# Patient Record
Sex: MALE | Race: WHITE | NOT HISPANIC OR LATINO | Employment: FULL TIME | URBAN - METROPOLITAN AREA
[De-identification: names, ages, dates, MRNs, and addresses within clinical notes are randomized per-mention and may not be internally consistent; named-entity substitution may affect disease eponyms.]

---

## 2017-01-18 ENCOUNTER — APPOINTMENT (OUTPATIENT)
Dept: RADIOLOGY | Facility: MEDICAL CENTER | Age: 57
End: 2017-01-18
Attending: SURGERY
Payer: COMMERCIAL

## 2017-02-09 ENCOUNTER — HOSPITAL ENCOUNTER (OUTPATIENT)
Dept: RADIOLOGY | Facility: MEDICAL CENTER | Age: 57
End: 2017-02-09
Attending: SURGERY
Payer: COMMERCIAL

## 2017-02-09 DIAGNOSIS — I65.21 STENOSIS OF RIGHT CAROTID ARTERY: ICD-10-CM

## 2017-02-09 PROCEDURE — 93880 EXTRACRANIAL BILAT STUDY: CPT

## 2020-10-10 ENCOUNTER — HOSPITAL ENCOUNTER (INPATIENT)
Dept: HOSPITAL 8 - ED | Age: 60
LOS: 4 days | Discharge: HOME | DRG: 855 | End: 2020-10-14
Attending: INTERNAL MEDICINE | Admitting: FAMILY MEDICINE
Payer: COMMERCIAL

## 2020-10-10 VITALS — DIASTOLIC BLOOD PRESSURE: 75 MMHG | SYSTOLIC BLOOD PRESSURE: 110 MMHG

## 2020-10-10 VITALS — BODY MASS INDEX: 35.95 KG/M2 | WEIGHT: 229.06 LBS | HEIGHT: 67 IN

## 2020-10-10 DIAGNOSIS — N49.2: ICD-10-CM

## 2020-10-10 DIAGNOSIS — Z20.828: ICD-10-CM

## 2020-10-10 DIAGNOSIS — I25.10: ICD-10-CM

## 2020-10-10 DIAGNOSIS — Z91.19: ICD-10-CM

## 2020-10-10 DIAGNOSIS — I48.0: ICD-10-CM

## 2020-10-10 DIAGNOSIS — E66.9: ICD-10-CM

## 2020-10-10 DIAGNOSIS — I25.2: ICD-10-CM

## 2020-10-10 DIAGNOSIS — A41.9: Primary | ICD-10-CM

## 2020-10-10 DIAGNOSIS — G89.29: ICD-10-CM

## 2020-10-10 DIAGNOSIS — Z95.5: ICD-10-CM

## 2020-10-10 DIAGNOSIS — E78.00: ICD-10-CM

## 2020-10-10 DIAGNOSIS — Z86.73: ICD-10-CM

## 2020-10-10 DIAGNOSIS — K40.90: ICD-10-CM

## 2020-10-10 DIAGNOSIS — E11.65: ICD-10-CM

## 2020-10-10 DIAGNOSIS — G47.33: ICD-10-CM

## 2020-10-10 DIAGNOSIS — Z90.79: ICD-10-CM

## 2020-10-10 DIAGNOSIS — Z88.8: ICD-10-CM

## 2020-10-10 DIAGNOSIS — E78.5: ICD-10-CM

## 2020-10-10 DIAGNOSIS — I10: ICD-10-CM

## 2020-10-10 DIAGNOSIS — F32.9: ICD-10-CM

## 2020-10-10 DIAGNOSIS — Z85.46: ICD-10-CM

## 2020-10-10 LAB
ALBUMIN SERPL-MCNC: 3.2 G/DL (ref 3.4–5)
ANION GAP SERPL CALC-SCNC: 4 MMOL/L (ref 5–15)
BASOPHILS # BLD AUTO: 0.1 X10^3/UL (ref 0–0.1)
BASOPHILS NFR BLD AUTO: 1 % (ref 0–1)
CALCIUM SERPL-MCNC: 9.2 MG/DL (ref 8.5–10.1)
CHLORIDE SERPL-SCNC: 104 MMOL/L (ref 98–107)
CREAT SERPL-MCNC: 1.21 MG/DL (ref 0.7–1.3)
EOSINOPHIL # BLD AUTO: 0.1 X10^3/UL (ref 0–0.4)
EOSINOPHIL NFR BLD AUTO: 2 % (ref 1–7)
ERYTHROCYTE [DISTWIDTH] IN BLOOD BY AUTOMATED COUNT: 14.5 % (ref 9.4–14.8)
LYMPHOCYTES # BLD AUTO: 1.4 X10^3/UL (ref 1–3.4)
LYMPHOCYTES NFR BLD AUTO: 15 % (ref 22–44)
MCH RBC QN AUTO: 29.1 PG (ref 27.5–34.5)
MCHC RBC AUTO-ENTMCNC: 33.7 G/DL (ref 33.2–36.2)
MD: NO
MICROSCOPIC: (no result)
MONOCYTES # BLD AUTO: 0.7 X10^3/UL (ref 0.2–0.8)
MONOCYTES NFR BLD AUTO: 8 % (ref 2–9)
NEUTROPHILS # BLD AUTO: 7 X10^3/UL (ref 1.8–6.8)
NEUTROPHILS NFR BLD AUTO: 75 % (ref 42–75)
PLATELET # BLD AUTO: 248 X10^3/UL (ref 130–400)
PMV BLD AUTO: 8.3 FL (ref 7.4–10.4)
RBC # BLD AUTO: 5.05 X10^6/UL (ref 4.38–5.82)

## 2020-10-10 PROCEDURE — 96361 HYDRATE IV INFUSION ADD-ON: CPT

## 2020-10-10 PROCEDURE — 85025 COMPLETE CBC W/AUTO DIFF WBC: CPT

## 2020-10-10 PROCEDURE — 87635 SARS-COV-2 COVID-19 AMP PRB: CPT

## 2020-10-10 PROCEDURE — 74177 CT ABD & PELVIS W/CONTRAST: CPT

## 2020-10-10 PROCEDURE — 93005 ELECTROCARDIOGRAM TRACING: CPT

## 2020-10-10 PROCEDURE — 87186 SC STD MICRODIL/AGAR DIL: CPT

## 2020-10-10 PROCEDURE — 87077 CULTURE AEROBIC IDENTIFY: CPT

## 2020-10-10 PROCEDURE — 90686 IIV4 VACC NO PRSV 0.5 ML IM: CPT

## 2020-10-10 PROCEDURE — 76870 US EXAM SCROTUM: CPT

## 2020-10-10 PROCEDURE — 36415 COLL VENOUS BLD VENIPUNCTURE: CPT

## 2020-10-10 PROCEDURE — 96375 TX/PRO/DX INJ NEW DRUG ADDON: CPT

## 2020-10-10 PROCEDURE — 80202 ASSAY OF VANCOMYCIN: CPT

## 2020-10-10 PROCEDURE — 80048 BASIC METABOLIC PNL TOTAL CA: CPT

## 2020-10-10 PROCEDURE — 87075 CULTR BACTERIA EXCEPT BLOOD: CPT

## 2020-10-10 PROCEDURE — 87070 CULTURE OTHR SPECIMN AEROBIC: CPT

## 2020-10-10 PROCEDURE — 82040 ASSAY OF SERUM ALBUMIN: CPT

## 2020-10-10 PROCEDURE — 84145 PROCALCITONIN (PCT): CPT

## 2020-10-10 PROCEDURE — 87205 SMEAR GRAM STAIN: CPT

## 2020-10-10 PROCEDURE — 85520 HEPARIN ASSAY: CPT

## 2020-10-10 PROCEDURE — 83605 ASSAY OF LACTIC ACID: CPT

## 2020-10-10 PROCEDURE — 81001 URINALYSIS AUTO W/SCOPE: CPT

## 2020-10-10 PROCEDURE — 96365 THER/PROPH/DIAG IV INF INIT: CPT

## 2020-10-10 PROCEDURE — 87040 BLOOD CULTURE FOR BACTERIA: CPT

## 2020-10-10 PROCEDURE — 82962 GLUCOSE BLOOD TEST: CPT

## 2020-10-10 PROCEDURE — 83036 HEMOGLOBIN GLYCOSYLATED A1C: CPT

## 2020-10-10 RX ADMIN — METRONIDAZOLE SCH MLS/HR: 500 INJECTION, SOLUTION INTRAVENOUS at 22:25

## 2020-10-10 RX ADMIN — HYDROCODONE BITARTRATE AND ACETAMINOPHEN PRN TAB: 5; 325 TABLET ORAL at 22:24

## 2020-10-10 RX ADMIN — MORPHINE SULFATE PRN MG: 4 INJECTION INTRAVENOUS at 13:38

## 2020-10-10 RX ADMIN — INSULIN LISPRO SCH UNITS: 100 INJECTION, SOLUTION INTRAVENOUS; SUBCUTANEOUS at 21:00

## 2020-10-10 RX ADMIN — MORPHINE SULFATE PRN MG: 4 INJECTION INTRAVENOUS at 22:02

## 2020-10-10 RX ADMIN — INSULIN LISPRO SCH UNITS: 100 INJECTION, SOLUTION INTRAVENOUS; SUBCUTANEOUS at 21:11

## 2020-10-10 RX ADMIN — FAMOTIDINE SCH MG: 20 TABLET, FILM COATED ORAL at 21:00

## 2020-10-10 RX ADMIN — HYDROCODONE BITARTRATE AND ACETAMINOPHEN PRN TAB: 5; 325 TABLET ORAL at 17:59

## 2020-10-10 RX ADMIN — ATORVASTATIN CALCIUM SCH MG: 80 TABLET, FILM COATED ORAL at 21:10

## 2020-10-10 RX ADMIN — ENOXAPARIN SODIUM SCH MG: 40 INJECTION SUBCUTANEOUS at 21:00

## 2020-10-10 RX ADMIN — AMPICILLIN SODIUM AND SULBACTAM SODIUM SCH MLS/HR: 2; 1 INJECTION, POWDER, FOR SOLUTION INTRAMUSCULAR; INTRAVENOUS at 21:10

## 2020-10-10 RX ADMIN — AMLODIPINE BESYLATE SCH MG: 10 TABLET ORAL at 21:09

## 2020-10-10 NOTE — NUR
PT REPORTS HE HAS A PAINFUL MASS IN HIS TESTCLE THAT IS CAUSING RIGHT SIDED 
LOWER ABD PAIN X2 DAYS. FAMILY IN ROOM. PT IS AT US.

## 2020-10-10 NOTE — NUR
PT RESTING IN ROOM. VS STABLE. NO ACUTE DISTRESS NOTED. CALL LIGHT IN PLACE. 
WILL CONTINUE TO MONITOR.

## 2020-10-10 NOTE — NUR
PT VISITING WITH WIFE IN ROOM. UA SENT. VS STABLE. NO ACUTE DISTRESS NOTED. 
WILL CONTINUE TO MONITOR.

## 2020-10-11 VITALS — SYSTOLIC BLOOD PRESSURE: 115 MMHG | DIASTOLIC BLOOD PRESSURE: 80 MMHG

## 2020-10-11 VITALS — SYSTOLIC BLOOD PRESSURE: 125 MMHG | DIASTOLIC BLOOD PRESSURE: 85 MMHG

## 2020-10-11 VITALS — DIASTOLIC BLOOD PRESSURE: 78 MMHG | SYSTOLIC BLOOD PRESSURE: 118 MMHG

## 2020-10-11 VITALS — SYSTOLIC BLOOD PRESSURE: 120 MMHG | DIASTOLIC BLOOD PRESSURE: 86 MMHG

## 2020-10-11 LAB
ANION GAP SERPL CALC-SCNC: 4 MMOL/L (ref 5–15)
BASOPHILS # BLD AUTO: 0 X10^3/UL (ref 0–0.1)
BASOPHILS NFR BLD AUTO: 1 % (ref 0–1)
CALCIUM SERPL-MCNC: 9.1 MG/DL (ref 8.5–10.1)
CHLORIDE SERPL-SCNC: 109 MMOL/L (ref 98–107)
CREAT SERPL-MCNC: 1.15 MG/DL (ref 0.7–1.3)
EOSINOPHIL # BLD AUTO: 0.1 X10^3/UL (ref 0–0.4)
EOSINOPHIL NFR BLD AUTO: 2 % (ref 1–7)
ERYTHROCYTE [DISTWIDTH] IN BLOOD BY AUTOMATED COUNT: 14.5 % (ref 9.4–14.8)
EST. AVERAGE GLUCOSE BLD GHB EST-MCNC: 318 MG/DL (ref 0–126)
LYMPHOCYTES # BLD AUTO: 1.2 X10^3/UL (ref 1–3.4)
LYMPHOCYTES NFR BLD AUTO: 19 % (ref 22–44)
MCH RBC QN AUTO: 29.1 PG (ref 27.5–34.5)
MCHC RBC AUTO-ENTMCNC: 33.1 G/DL (ref 33.2–36.2)
MD: NO
MONOCYTES # BLD AUTO: 0.6 X10^3/UL (ref 0.2–0.8)
MONOCYTES NFR BLD AUTO: 10 % (ref 2–9)
NEUTROPHILS # BLD AUTO: 4.3 X10^3/UL (ref 1.8–6.8)
NEUTROPHILS NFR BLD AUTO: 69 % (ref 42–75)
PLATELET # BLD AUTO: 233 X10^3/UL (ref 130–400)
PMV BLD AUTO: 8.2 FL (ref 7.4–10.4)
RBC # BLD AUTO: 4.7 X10^6/UL (ref 4.38–5.82)

## 2020-10-11 PROCEDURE — 0V950ZZ DRAINAGE OF SCROTUM, OPEN APPROACH: ICD-10-PCS | Performed by: UROLOGY

## 2020-10-11 RX ADMIN — AMPICILLIN SODIUM AND SULBACTAM SODIUM SCH MLS/HR: 2; 1 INJECTION, POWDER, FOR SOLUTION INTRAMUSCULAR; INTRAVENOUS at 09:05

## 2020-10-11 RX ADMIN — HYDROCODONE BITARTRATE AND ACETAMINOPHEN PRN TAB: 5; 325 TABLET ORAL at 16:41

## 2020-10-11 RX ADMIN — GABAPENTIN SCH MG: 300 CAPSULE ORAL at 16:00

## 2020-10-11 RX ADMIN — METRONIDAZOLE SCH MLS/HR: 500 INJECTION, SOLUTION INTRAVENOUS at 15:13

## 2020-10-11 RX ADMIN — ATORVASTATIN CALCIUM SCH MG: 80 TABLET, FILM COATED ORAL at 21:12

## 2020-10-11 RX ADMIN — DOCUSATE SODIUM 50MG AND SENNOSIDES 8.6MG SCH TAB: 8.6; 5 TABLET, FILM COATED ORAL at 08:12

## 2020-10-11 RX ADMIN — AMPICILLIN SODIUM AND SULBACTAM SODIUM SCH MLS/HR: 2; 1 INJECTION, POWDER, FOR SOLUTION INTRAMUSCULAR; INTRAVENOUS at 02:53

## 2020-10-11 RX ADMIN — ATENOLOL SCH MG: 50 TABLET ORAL at 21:10

## 2020-10-11 RX ADMIN — AMLODIPINE BESYLATE SCH MG: 10 TABLET ORAL at 21:11

## 2020-10-11 RX ADMIN — INSULIN LISPRO SCH UNITS: 100 INJECTION, SOLUTION INTRAVENOUS; SUBCUTANEOUS at 08:08

## 2020-10-11 RX ADMIN — INSULIN LISPRO SCH UNITS: 100 INJECTION, SOLUTION INTRAVENOUS; SUBCUTANEOUS at 21:09

## 2020-10-11 RX ADMIN — ASPIRIN SCH MG: 325 TABLET, DELAYED RELEASE ORAL at 08:10

## 2020-10-11 RX ADMIN — VANCOMYCIN HYDROCHLORIDE SCH MLS/HR: 1 INJECTION, POWDER, LYOPHILIZED, FOR SOLUTION INTRAVENOUS at 10:14

## 2020-10-11 RX ADMIN — INSULIN LISPRO SCH UNITS: 100 INJECTION, SOLUTION INTRAVENOUS; SUBCUTANEOUS at 11:38

## 2020-10-11 RX ADMIN — HYDROCODONE BITARTRATE AND ACETAMINOPHEN PRN TAB: 5; 325 TABLET ORAL at 08:12

## 2020-10-11 RX ADMIN — METRONIDAZOLE SCH MLS/HR: 500 INJECTION, SOLUTION INTRAVENOUS at 05:42

## 2020-10-11 RX ADMIN — ENOXAPARIN SODIUM SCH MG: 40 INJECTION SUBCUTANEOUS at 21:10

## 2020-10-11 RX ADMIN — FAMOTIDINE SCH MG: 20 TABLET, FILM COATED ORAL at 08:09

## 2020-10-11 RX ADMIN — AMPICILLIN SODIUM AND SULBACTAM SODIUM SCH MLS/HR: 2; 1 INJECTION, POWDER, FOR SOLUTION INTRAMUSCULAR; INTRAVENOUS at 17:04

## 2020-10-11 RX ADMIN — FAMOTIDINE SCH MG: 20 TABLET, FILM COATED ORAL at 21:11

## 2020-10-11 RX ADMIN — GABAPENTIN SCH MG: 300 CAPSULE ORAL at 21:09

## 2020-10-11 RX ADMIN — AMPICILLIN SODIUM AND SULBACTAM SODIUM SCH MLS/HR: 2; 1 INJECTION, POWDER, FOR SOLUTION INTRAMUSCULAR; INTRAVENOUS at 22:45

## 2020-10-11 RX ADMIN — SERTRALINE HYDROCHLORIDE SCH MG: 100 TABLET ORAL at 08:10

## 2020-10-11 RX ADMIN — METRONIDAZOLE SCH MLS/HR: 500 INJECTION, SOLUTION INTRAVENOUS at 23:40

## 2020-10-11 RX ADMIN — INSULIN LISPRO SCH UNITS: 100 INJECTION, SOLUTION INTRAVENOUS; SUBCUTANEOUS at 16:00

## 2020-10-12 VITALS — SYSTOLIC BLOOD PRESSURE: 116 MMHG | DIASTOLIC BLOOD PRESSURE: 78 MMHG

## 2020-10-12 VITALS — DIASTOLIC BLOOD PRESSURE: 76 MMHG | SYSTOLIC BLOOD PRESSURE: 109 MMHG

## 2020-10-12 VITALS — DIASTOLIC BLOOD PRESSURE: 89 MMHG | SYSTOLIC BLOOD PRESSURE: 128 MMHG

## 2020-10-12 VITALS — DIASTOLIC BLOOD PRESSURE: 90 MMHG | SYSTOLIC BLOOD PRESSURE: 127 MMHG

## 2020-10-12 RX ADMIN — METRONIDAZOLE SCH MLS/HR: 500 INJECTION, SOLUTION INTRAVENOUS at 15:04

## 2020-10-12 RX ADMIN — ATORVASTATIN CALCIUM SCH MG: 80 TABLET, FILM COATED ORAL at 20:57

## 2020-10-12 RX ADMIN — VANCOMYCIN HYDROCHLORIDE SCH MLS/HR: 1 INJECTION, POWDER, LYOPHILIZED, FOR SOLUTION INTRAVENOUS at 03:51

## 2020-10-12 RX ADMIN — ATENOLOL SCH MG: 50 TABLET ORAL at 20:59

## 2020-10-12 RX ADMIN — HEPARIN SODIUM PRN MLS/HR: 10000 INJECTION, SOLUTION INTRAVENOUS at 02:35

## 2020-10-12 RX ADMIN — HYDROCODONE BITARTRATE AND ACETAMINOPHEN PRN TAB: 5; 325 TABLET ORAL at 07:03

## 2020-10-12 RX ADMIN — AMPICILLIN SODIUM AND SULBACTAM SODIUM SCH MLS/HR: 2; 1 INJECTION, POWDER, FOR SOLUTION INTRAMUSCULAR; INTRAVENOUS at 06:16

## 2020-10-12 RX ADMIN — INSULIN LISPRO SCH UNITS: 100 INJECTION, SOLUTION INTRAVENOUS; SUBCUTANEOUS at 08:10

## 2020-10-12 RX ADMIN — HEPARIN SODIUM PRN UNITS: 5000 INJECTION, SOLUTION INTRAVENOUS; SUBCUTANEOUS at 15:42

## 2020-10-12 RX ADMIN — FAMOTIDINE SCH MG: 20 TABLET, FILM COATED ORAL at 20:58

## 2020-10-12 RX ADMIN — METRONIDAZOLE SCH MLS/HR: 500 INJECTION, SOLUTION INTRAVENOUS at 07:03

## 2020-10-12 RX ADMIN — AMLODIPINE BESYLATE SCH MG: 10 TABLET ORAL at 20:58

## 2020-10-12 RX ADMIN — INSULIN LISPRO SCH UNITS: 100 INJECTION, SOLUTION INTRAVENOUS; SUBCUTANEOUS at 11:13

## 2020-10-12 RX ADMIN — AMPICILLIN SODIUM AND SULBACTAM SODIUM SCH MLS/HR: 2; 1 INJECTION, POWDER, FOR SOLUTION INTRAMUSCULAR; INTRAVENOUS at 11:06

## 2020-10-12 RX ADMIN — AMPICILLIN SODIUM AND SULBACTAM SODIUM SCH MLS/HR: 2; 1 INJECTION, POWDER, FOR SOLUTION INTRAMUSCULAR; INTRAVENOUS at 16:54

## 2020-10-12 RX ADMIN — INSULIN LISPRO SCH UNITS: 100 INJECTION, SOLUTION INTRAVENOUS; SUBCUTANEOUS at 16:56

## 2020-10-12 RX ADMIN — HEPARIN SODIUM PRN UNITS: 5000 INJECTION, SOLUTION INTRAVENOUS; SUBCUTANEOUS at 09:01

## 2020-10-12 RX ADMIN — VANCOMYCIN HYDROCHLORIDE SCH MLS/HR: 1 INJECTION, POWDER, LYOPHILIZED, FOR SOLUTION INTRAVENOUS at 22:29

## 2020-10-12 RX ADMIN — ASPIRIN SCH MG: 325 TABLET, DELAYED RELEASE ORAL at 08:00

## 2020-10-12 RX ADMIN — GABAPENTIN SCH MG: 300 CAPSULE ORAL at 20:57

## 2020-10-12 RX ADMIN — HYDROCODONE BITARTRATE AND ACETAMINOPHEN PRN TAB: 5; 325 TABLET ORAL at 15:48

## 2020-10-12 RX ADMIN — SERTRALINE HYDROCHLORIDE SCH MG: 100 TABLET ORAL at 08:00

## 2020-10-12 RX ADMIN — GABAPENTIN SCH MG: 300 CAPSULE ORAL at 15:46

## 2020-10-12 RX ADMIN — FENOFIBRATE SCH MG: 145 TABLET, FILM COATED ORAL at 08:09

## 2020-10-12 RX ADMIN — GABAPENTIN SCH MG: 300 CAPSULE ORAL at 06:17

## 2020-10-12 RX ADMIN — DOCUSATE SODIUM 50MG AND SENNOSIDES 8.6MG SCH TAB: 8.6; 5 TABLET, FILM COATED ORAL at 08:11

## 2020-10-12 RX ADMIN — HEPARIN SODIUM PRN UNITS: 5000 INJECTION, SOLUTION INTRAVENOUS; SUBCUTANEOUS at 22:29

## 2020-10-12 RX ADMIN — FAMOTIDINE SCH MG: 20 TABLET, FILM COATED ORAL at 08:08

## 2020-10-12 RX ADMIN — SERTRALINE HYDROCHLORIDE SCH MG: 100 TABLET ORAL at 08:09

## 2020-10-12 RX ADMIN — GABAPENTIN SCH MG: 300 CAPSULE ORAL at 11:06

## 2020-10-12 RX ADMIN — ATENOLOL SCH MG: 50 TABLET ORAL at 08:00

## 2020-10-12 RX ADMIN — INSULIN LISPRO SCH UNITS: 100 INJECTION, SOLUTION INTRAVENOUS; SUBCUTANEOUS at 20:57

## 2020-10-13 VITALS — SYSTOLIC BLOOD PRESSURE: 118 MMHG | DIASTOLIC BLOOD PRESSURE: 85 MMHG

## 2020-10-13 VITALS — SYSTOLIC BLOOD PRESSURE: 121 MMHG | DIASTOLIC BLOOD PRESSURE: 85 MMHG

## 2020-10-13 VITALS — SYSTOLIC BLOOD PRESSURE: 131 MMHG | DIASTOLIC BLOOD PRESSURE: 100 MMHG

## 2020-10-13 VITALS — SYSTOLIC BLOOD PRESSURE: 130 MMHG | DIASTOLIC BLOOD PRESSURE: 90 MMHG

## 2020-10-13 LAB
ANION GAP SERPL CALC-SCNC: 5 MMOL/L (ref 5–15)
BASOPHILS # BLD AUTO: 0 X10^3/UL (ref 0–0.1)
BASOPHILS NFR BLD AUTO: 1 % (ref 0–1)
CALCIUM SERPL-MCNC: 9.1 MG/DL (ref 8.5–10.1)
CHLORIDE SERPL-SCNC: 108 MMOL/L (ref 98–107)
CREAT SERPL-MCNC: 1.08 MG/DL (ref 0.7–1.3)
EOSINOPHIL # BLD AUTO: 0.1 X10^3/UL (ref 0–0.4)
EOSINOPHIL NFR BLD AUTO: 3 % (ref 1–7)
ERYTHROCYTE [DISTWIDTH] IN BLOOD BY AUTOMATED COUNT: 14.6 % (ref 9.4–14.8)
LYMPHOCYTES # BLD AUTO: 1.3 X10^3/UL (ref 1–3.4)
LYMPHOCYTES NFR BLD AUTO: 30 % (ref 22–44)
MCH RBC QN AUTO: 28.8 PG (ref 27.5–34.5)
MCHC RBC AUTO-ENTMCNC: 32.9 G/DL (ref 33.2–36.2)
MD: NO
MONOCYTES # BLD AUTO: 0.4 X10^3/UL (ref 0.2–0.8)
MONOCYTES NFR BLD AUTO: 10 % (ref 2–9)
NEUTROPHILS # BLD AUTO: 2.5 X10^3/UL (ref 1.8–6.8)
NEUTROPHILS NFR BLD AUTO: 57 % (ref 42–75)
PLATELET # BLD AUTO: 249 X10^3/UL (ref 130–400)
PMV BLD AUTO: 8.2 FL (ref 7.4–10.4)
RBC # BLD AUTO: 4.92 X10^6/UL (ref 4.38–5.82)

## 2020-10-13 RX ADMIN — AMPICILLIN SODIUM AND SULBACTAM SODIUM SCH MLS/HR: 2; 1 INJECTION, POWDER, FOR SOLUTION INTRAMUSCULAR; INTRAVENOUS at 12:15

## 2020-10-13 RX ADMIN — HYDROCODONE BITARTRATE AND ACETAMINOPHEN PRN TAB: 5; 325 TABLET ORAL at 12:22

## 2020-10-13 RX ADMIN — FAMOTIDINE SCH MG: 20 TABLET, FILM COATED ORAL at 21:28

## 2020-10-13 RX ADMIN — INSULIN LISPRO SCH UNITS: 100 INJECTION, SOLUTION INTRAVENOUS; SUBCUTANEOUS at 16:36

## 2020-10-13 RX ADMIN — GABAPENTIN SCH MG: 300 CAPSULE ORAL at 15:55

## 2020-10-13 RX ADMIN — ATORVASTATIN CALCIUM SCH MG: 80 TABLET, FILM COATED ORAL at 21:27

## 2020-10-13 RX ADMIN — GABAPENTIN SCH MG: 300 CAPSULE ORAL at 11:05

## 2020-10-13 RX ADMIN — FENOFIBRATE SCH MG: 145 TABLET, FILM COATED ORAL at 07:55

## 2020-10-13 RX ADMIN — GABAPENTIN SCH MG: 300 CAPSULE ORAL at 06:21

## 2020-10-13 RX ADMIN — HYDROCODONE BITARTRATE AND ACETAMINOPHEN PRN TAB: 5; 325 TABLET ORAL at 02:31

## 2020-10-13 RX ADMIN — ATENOLOL SCH MG: 50 TABLET ORAL at 21:27

## 2020-10-13 RX ADMIN — AMPICILLIN SODIUM AND SULBACTAM SODIUM SCH MLS/HR: 2; 1 INJECTION, POWDER, FOR SOLUTION INTRAMUSCULAR; INTRAVENOUS at 00:34

## 2020-10-13 RX ADMIN — ATENOLOL SCH MG: 50 TABLET ORAL at 07:55

## 2020-10-13 RX ADMIN — FAMOTIDINE SCH MG: 20 TABLET, FILM COATED ORAL at 07:55

## 2020-10-13 RX ADMIN — VANCOMYCIN HYDROCHLORIDE SCH MLS/HR: 1 INJECTION, POWDER, LYOPHILIZED, FOR SOLUTION INTRAVENOUS at 15:56

## 2020-10-13 RX ADMIN — SERTRALINE HYDROCHLORIDE SCH MG: 100 TABLET ORAL at 07:55

## 2020-10-13 RX ADMIN — DOCUSATE SODIUM 50MG AND SENNOSIDES 8.6MG SCH TAB: 8.6; 5 TABLET, FILM COATED ORAL at 07:55

## 2020-10-13 RX ADMIN — HYDROCODONE BITARTRATE AND ACETAMINOPHEN PRN TAB: 5; 325 TABLET ORAL at 07:58

## 2020-10-13 RX ADMIN — ASPIRIN SCH MG: 81 TABLET, COATED ORAL at 07:55

## 2020-10-13 RX ADMIN — METRONIDAZOLE SCH MLS/HR: 500 INJECTION, SOLUTION INTRAVENOUS at 01:05

## 2020-10-13 RX ADMIN — HEPARIN SODIUM PRN MLS/HR: 10000 INJECTION, SOLUTION INTRAVENOUS at 01:07

## 2020-10-13 RX ADMIN — INSULIN LISPRO SCH UNITS: 100 INJECTION, SOLUTION INTRAVENOUS; SUBCUTANEOUS at 21:27

## 2020-10-13 RX ADMIN — METRONIDAZOLE SCH MLS/HR: 500 INJECTION, SOLUTION INTRAVENOUS at 09:25

## 2020-10-13 RX ADMIN — AMLODIPINE BESYLATE SCH MG: 10 TABLET ORAL at 21:27

## 2020-10-13 RX ADMIN — GABAPENTIN SCH MG: 300 CAPSULE ORAL at 21:27

## 2020-10-13 RX ADMIN — APIXABAN SCH MG: 5 TABLET, FILM COATED ORAL at 21:27

## 2020-10-13 RX ADMIN — AMPICILLIN SODIUM AND SULBACTAM SODIUM SCH MLS/HR: 2; 1 INJECTION, POWDER, FOR SOLUTION INTRAMUSCULAR; INTRAVENOUS at 06:21

## 2020-10-13 RX ADMIN — HEPARIN SODIUM PRN UNITS: 5000 INJECTION, SOLUTION INTRAVENOUS; SUBCUTANEOUS at 04:35

## 2020-10-13 RX ADMIN — HYDROCODONE BITARTRATE AND ACETAMINOPHEN PRN TAB: 5; 325 TABLET ORAL at 20:17

## 2020-10-13 RX ADMIN — AMPICILLIN SODIUM AND SULBACTAM SODIUM SCH MLS/HR: 2; 1 INJECTION, POWDER, FOR SOLUTION INTRAMUSCULAR; INTRAVENOUS at 18:13

## 2020-10-13 RX ADMIN — INSULIN LISPRO SCH UNITS: 100 INJECTION, SOLUTION INTRAVENOUS; SUBCUTANEOUS at 12:16

## 2020-10-13 RX ADMIN — APIXABAN SCH MG: 5 TABLET, FILM COATED ORAL at 11:05

## 2020-10-13 RX ADMIN — INSULIN LISPRO SCH UNITS: 100 INJECTION, SOLUTION INTRAVENOUS; SUBCUTANEOUS at 07:57

## 2020-10-14 VITALS — DIASTOLIC BLOOD PRESSURE: 105 MMHG | SYSTOLIC BLOOD PRESSURE: 139 MMHG

## 2020-10-14 VITALS — SYSTOLIC BLOOD PRESSURE: 121 MMHG | DIASTOLIC BLOOD PRESSURE: 83 MMHG

## 2020-10-14 VITALS — DIASTOLIC BLOOD PRESSURE: 82 MMHG | SYSTOLIC BLOOD PRESSURE: 124 MMHG

## 2020-10-14 VITALS — SYSTOLIC BLOOD PRESSURE: 124 MMHG | DIASTOLIC BLOOD PRESSURE: 94 MMHG

## 2020-10-14 RX ADMIN — GABAPENTIN SCH MG: 300 CAPSULE ORAL at 05:22

## 2020-10-14 RX ADMIN — ASPIRIN SCH MG: 81 TABLET, COATED ORAL at 08:14

## 2020-10-14 RX ADMIN — INSULIN LISPRO SCH UNITS: 100 INJECTION, SOLUTION INTRAVENOUS; SUBCUTANEOUS at 16:56

## 2020-10-14 RX ADMIN — SERTRALINE HYDROCHLORIDE SCH MG: 100 TABLET ORAL at 08:14

## 2020-10-14 RX ADMIN — SERTRALINE HYDROCHLORIDE SCH MG: 100 TABLET ORAL at 07:57

## 2020-10-14 RX ADMIN — AMPICILLIN SODIUM AND SULBACTAM SODIUM SCH MLS/HR: 2; 1 INJECTION, POWDER, FOR SOLUTION INTRAMUSCULAR; INTRAVENOUS at 00:42

## 2020-10-14 RX ADMIN — INSULIN LISPRO SCH UNITS: 100 INJECTION, SOLUTION INTRAVENOUS; SUBCUTANEOUS at 12:06

## 2020-10-14 RX ADMIN — HYDROCODONE BITARTRATE AND ACETAMINOPHEN PRN TAB: 5; 325 TABLET ORAL at 08:21

## 2020-10-14 RX ADMIN — AMPICILLIN SODIUM AND SULBACTAM SODIUM SCH MLS/HR: 2; 1 INJECTION, POWDER, FOR SOLUTION INTRAMUSCULAR; INTRAVENOUS at 06:08

## 2020-10-14 RX ADMIN — VANCOMYCIN HYDROCHLORIDE SCH MLS/HR: 1 INJECTION, POWDER, LYOPHILIZED, FOR SOLUTION INTRAVENOUS at 10:08

## 2020-10-14 RX ADMIN — GABAPENTIN SCH MG: 300 CAPSULE ORAL at 10:05

## 2020-10-14 RX ADMIN — APIXABAN SCH MG: 5 TABLET, FILM COATED ORAL at 08:14

## 2020-10-14 RX ADMIN — ATENOLOL SCH MG: 50 TABLET ORAL at 08:14

## 2020-10-14 RX ADMIN — FENOFIBRATE SCH MG: 145 TABLET, FILM COATED ORAL at 08:14

## 2020-10-14 RX ADMIN — FAMOTIDINE SCH MG: 20 TABLET, FILM COATED ORAL at 08:15

## 2020-10-14 RX ADMIN — DOCUSATE SODIUM 50MG AND SENNOSIDES 8.6MG SCH TAB: 8.6; 5 TABLET, FILM COATED ORAL at 07:56

## 2020-10-14 RX ADMIN — INSULIN LISPRO SCH UNITS: 100 INJECTION, SOLUTION INTRAVENOUS; SUBCUTANEOUS at 07:56

## 2020-10-26 ENCOUNTER — HOSPITAL ENCOUNTER (EMERGENCY)
Dept: HOSPITAL 8 - ED | Age: 60
Discharge: HOME | End: 2020-10-26
Payer: COMMERCIAL

## 2020-10-26 VITALS — WEIGHT: 219.14 LBS | BODY MASS INDEX: 34.39 KG/M2 | HEIGHT: 67 IN

## 2020-10-26 VITALS — DIASTOLIC BLOOD PRESSURE: 93 MMHG | SYSTOLIC BLOOD PRESSURE: 143 MMHG

## 2020-10-26 DIAGNOSIS — Z86.73: ICD-10-CM

## 2020-10-26 DIAGNOSIS — H54.3: Primary | ICD-10-CM

## 2020-10-26 DIAGNOSIS — I10: ICD-10-CM

## 2020-10-26 DIAGNOSIS — I25.2: ICD-10-CM

## 2020-10-26 DIAGNOSIS — E78.00: ICD-10-CM

## 2020-10-26 DIAGNOSIS — E11.9: ICD-10-CM

## 2020-10-26 PROCEDURE — 99281 EMR DPT VST MAYX REQ PHY/QHP: CPT

## 2021-05-27 ENCOUNTER — HOSPITAL ENCOUNTER (OUTPATIENT)
Dept: HOSPITAL 8 - CVU | Age: 61
Discharge: HOME | End: 2021-05-27
Attending: PHYSICIAN ASSISTANT
Payer: COMMERCIAL

## 2021-05-27 DIAGNOSIS — I08.0: Primary | ICD-10-CM

## 2021-05-27 DIAGNOSIS — I48.0: ICD-10-CM

## 2021-05-27 DIAGNOSIS — I11.9: ICD-10-CM

## 2021-05-27 PROCEDURE — 93017 CV STRESS TEST TRACING ONLY: CPT

## 2021-05-27 PROCEDURE — 93306 TTE W/DOPPLER COMPLETE: CPT

## 2021-05-27 PROCEDURE — A9502 TC99M TETROFOSMIN: HCPCS

## 2021-05-27 PROCEDURE — 78452 HT MUSCLE IMAGE SPECT MULT: CPT

## 2021-07-30 ENCOUNTER — HOSPITAL ENCOUNTER (OUTPATIENT)
Dept: HOSPITAL 8 - CACL | Age: 61
Discharge: HOME | End: 2021-07-30
Attending: INTERNAL MEDICINE
Payer: COMMERCIAL

## 2021-07-30 VITALS — BODY MASS INDEX: 36.85 KG/M2 | HEIGHT: 66 IN | WEIGHT: 229.28 LBS

## 2021-07-30 VITALS — SYSTOLIC BLOOD PRESSURE: 112 MMHG | DIASTOLIC BLOOD PRESSURE: 76 MMHG

## 2021-07-30 DIAGNOSIS — Z79.899: ICD-10-CM

## 2021-07-30 DIAGNOSIS — E11.9: ICD-10-CM

## 2021-07-30 DIAGNOSIS — Z79.01: ICD-10-CM

## 2021-07-30 DIAGNOSIS — E78.5: ICD-10-CM

## 2021-07-30 DIAGNOSIS — I25.10: ICD-10-CM

## 2021-07-30 DIAGNOSIS — Z79.891: ICD-10-CM

## 2021-07-30 DIAGNOSIS — Z88.8: ICD-10-CM

## 2021-07-30 DIAGNOSIS — Z79.82: ICD-10-CM

## 2021-07-30 DIAGNOSIS — I25.2: ICD-10-CM

## 2021-07-30 DIAGNOSIS — I48.0: Primary | ICD-10-CM

## 2021-07-30 DIAGNOSIS — Z79.84: ICD-10-CM

## 2021-07-30 DIAGNOSIS — I10: ICD-10-CM

## 2021-07-30 DIAGNOSIS — J45.909: ICD-10-CM

## 2021-07-30 LAB
ANION GAP SERPL CALC-SCNC: 4 MMOL/L (ref 5–15)
CALCIUM SERPL-MCNC: 9.4 MG/DL (ref 8.5–10.1)
CHLORIDE SERPL-SCNC: 105 MMOL/L (ref 98–107)
CREAT SERPL-MCNC: 1.57 MG/DL (ref 0.7–1.3)

## 2021-07-30 PROCEDURE — 36415 COLL VENOUS BLD VENIPUNCTURE: CPT

## 2021-07-30 PROCEDURE — 92960 CARDIOVERSION ELECTRIC EXT: CPT

## 2021-07-30 PROCEDURE — 80048 BASIC METABOLIC PNL TOTAL CA: CPT

## 2021-11-07 ENCOUNTER — APPOINTMENT (OUTPATIENT)
Dept: RADIOLOGY | Facility: MEDICAL CENTER | Age: 61
End: 2021-11-07
Attending: EMERGENCY MEDICINE
Payer: COMMERCIAL

## 2021-11-07 ENCOUNTER — HOSPITAL ENCOUNTER (EMERGENCY)
Facility: MEDICAL CENTER | Age: 61
End: 2021-11-07
Attending: EMERGENCY MEDICINE
Payer: COMMERCIAL

## 2021-11-07 ENCOUNTER — OFFICE VISIT (OUTPATIENT)
Dept: URGENT CARE | Facility: CLINIC | Age: 61
End: 2021-11-07
Payer: COMMERCIAL

## 2021-11-07 VITALS
DIASTOLIC BLOOD PRESSURE: 82 MMHG | OXYGEN SATURATION: 96 % | RESPIRATION RATE: 18 BRPM | HEART RATE: 83 BPM | WEIGHT: 240 LBS | TEMPERATURE: 98.1 F | SYSTOLIC BLOOD PRESSURE: 164 MMHG | BODY MASS INDEX: 38.57 KG/M2 | HEIGHT: 66 IN

## 2021-11-07 VITALS
HEART RATE: 57 BPM | SYSTOLIC BLOOD PRESSURE: 177 MMHG | DIASTOLIC BLOOD PRESSURE: 106 MMHG | TEMPERATURE: 98.9 F | OXYGEN SATURATION: 97 % | HEIGHT: 66 IN | RESPIRATION RATE: 16 BRPM | WEIGHT: 240.3 LBS | BODY MASS INDEX: 38.62 KG/M2

## 2021-11-07 DIAGNOSIS — R10.12 LUQ PAIN: ICD-10-CM

## 2021-11-07 DIAGNOSIS — R10.9 FLANK PAIN: ICD-10-CM

## 2021-11-07 LAB
ALBUMIN SERPL BCP-MCNC: 4.1 G/DL (ref 3.2–4.9)
ALBUMIN/GLOB SERPL: 1.5 G/DL
ALP SERPL-CCNC: 89 U/L (ref 30–99)
ALT SERPL-CCNC: 40 U/L (ref 2–50)
ANION GAP SERPL CALC-SCNC: 11 MMOL/L (ref 7–16)
APPEARANCE UR: CLEAR
APPEARANCE UR: CLEAR
AST SERPL-CCNC: 30 U/L (ref 12–45)
BASOPHILS # BLD AUTO: 1.1 % (ref 0–1.8)
BASOPHILS # BLD: 0.06 K/UL (ref 0–0.12)
BILIRUB SERPL-MCNC: 0.4 MG/DL (ref 0.1–1.5)
BILIRUB UR QL STRIP.AUTO: NEGATIVE
BILIRUB UR STRIP-MCNC: NORMAL MG/DL
BUN SERPL-MCNC: 13 MG/DL (ref 8–22)
CALCIUM SERPL-MCNC: 10.1 MG/DL (ref 8.4–10.2)
CHLORIDE SERPL-SCNC: 102 MMOL/L (ref 96–112)
CO2 SERPL-SCNC: 24 MMOL/L (ref 20–33)
COLOR UR AUTO: YELLOW
COLOR UR: YELLOW
CREAT SERPL-MCNC: 1.52 MG/DL (ref 0.5–1.4)
EOSINOPHIL # BLD AUTO: 0.16 K/UL (ref 0–0.51)
EOSINOPHIL NFR BLD: 2.8 % (ref 0–6.9)
ERYTHROCYTE [DISTWIDTH] IN BLOOD BY AUTOMATED COUNT: 47.3 FL (ref 35.9–50)
GLOBULIN SER CALC-MCNC: 2.7 G/DL (ref 1.9–3.5)
GLUCOSE SERPL-MCNC: 187 MG/DL (ref 65–99)
GLUCOSE UR STRIP.AUTO-MCNC: 250 MG/DL
GLUCOSE UR STRIP.AUTO-MCNC: NORMAL MG/DL
HCT VFR BLD AUTO: 41.2 % (ref 42–52)
HGB BLD-MCNC: 13.3 G/DL (ref 14–18)
IMM GRANULOCYTES # BLD AUTO: 0.04 K/UL (ref 0–0.11)
IMM GRANULOCYTES NFR BLD AUTO: 0.7 % (ref 0–0.9)
KETONES UR STRIP.AUTO-MCNC: NEGATIVE MG/DL
KETONES UR STRIP.AUTO-MCNC: NORMAL MG/DL
LEUKOCYTE ESTERASE UR QL STRIP.AUTO: NEGATIVE
LEUKOCYTE ESTERASE UR QL STRIP.AUTO: NORMAL
LIPASE SERPL-CCNC: 31 U/L (ref 7–58)
LYMPHOCYTES # BLD AUTO: 1.55 K/UL (ref 1–4.8)
LYMPHOCYTES NFR BLD: 27.5 % (ref 22–41)
MCH RBC QN AUTO: 27.4 PG (ref 27–33)
MCHC RBC AUTO-ENTMCNC: 32.3 G/DL (ref 33.7–35.3)
MCV RBC AUTO: 84.9 FL (ref 81.4–97.8)
MICRO URNS: ABNORMAL
MONOCYTES # BLD AUTO: 0.5 K/UL (ref 0–0.85)
MONOCYTES NFR BLD AUTO: 8.9 % (ref 0–13.4)
NEUTROPHILS # BLD AUTO: 3.32 K/UL (ref 1.82–7.42)
NEUTROPHILS NFR BLD: 59 % (ref 44–72)
NITRITE UR QL STRIP.AUTO: NEGATIVE
NITRITE UR QL STRIP.AUTO: NORMAL
NRBC # BLD AUTO: 0 K/UL
NRBC BLD-RTO: 0 /100 WBC
PH UR STRIP.AUTO: 6 [PH] (ref 5–8)
PH UR STRIP.AUTO: 6.5 [PH] (ref 5–8)
PLATELET # BLD AUTO: 300 K/UL (ref 164–446)
PMV BLD AUTO: 8.9 FL (ref 9–12.9)
POTASSIUM SERPL-SCNC: 4.3 MMOL/L (ref 3.6–5.5)
PROT SERPL-MCNC: 6.8 G/DL (ref 6–8.2)
PROT UR QL STRIP: NEGATIVE MG/DL
PROT UR QL STRIP: NORMAL MG/DL
RBC # BLD AUTO: 4.85 M/UL (ref 4.7–6.1)
RBC UR QL AUTO: NEGATIVE
RBC UR QL AUTO: NORMAL
SODIUM SERPL-SCNC: 137 MMOL/L (ref 135–145)
SP GR UR STRIP.AUTO: 1.01
SP GR UR STRIP.AUTO: <=1.005
UROBILINOGEN UR STRIP-MCNC: 0.2 MG/DL
WBC # BLD AUTO: 5.6 K/UL (ref 4.8–10.8)

## 2021-11-07 PROCEDURE — 99214 OFFICE O/P EST MOD 30 MIN: CPT | Performed by: PHYSICIAN ASSISTANT

## 2021-11-07 PROCEDURE — 83690 ASSAY OF LIPASE: CPT

## 2021-11-07 PROCEDURE — 93000 ELECTROCARDIOGRAM COMPLETE: CPT | Performed by: PHYSICIAN ASSISTANT

## 2021-11-07 PROCEDURE — 80053 COMPREHEN METABOLIC PANEL: CPT

## 2021-11-07 PROCEDURE — 74176 CT ABD & PELVIS W/O CONTRAST: CPT

## 2021-11-07 PROCEDURE — 85025 COMPLETE CBC W/AUTO DIFF WBC: CPT

## 2021-11-07 PROCEDURE — 81003 URINALYSIS AUTO W/O SCOPE: CPT

## 2021-11-07 PROCEDURE — 99284 EMERGENCY DEPT VISIT MOD MDM: CPT

## 2021-11-07 PROCEDURE — 81002 URINALYSIS NONAUTO W/O SCOPE: CPT | Performed by: PHYSICIAN ASSISTANT

## 2021-11-07 PROCEDURE — 700101 HCHG RX REV CODE 250: Performed by: EMERGENCY MEDICINE

## 2021-11-07 RX ORDER — FENOFIBRATE 160 MG/1
160 TABLET ORAL DAILY
COMMUNITY

## 2021-11-07 RX ORDER — SERTRALINE HYDROCHLORIDE 100 MG/1
200 TABLET, FILM COATED ORAL DAILY
COMMUNITY

## 2021-11-07 RX ORDER — ERGOCALCIFEROL 1.25 MG/1
CAPSULE ORAL
COMMUNITY

## 2021-11-07 RX ORDER — LIDOCAINE 50 MG/G
1 PATCH TOPICAL ONCE
Status: DISCONTINUED | OUTPATIENT
Start: 2021-11-07 | End: 2021-11-07 | Stop reason: HOSPADM

## 2021-11-07 RX ORDER — ATORVASTATIN CALCIUM 20 MG/1
40 TABLET, FILM COATED ORAL NIGHTLY
COMMUNITY

## 2021-11-07 RX ORDER — UBIDECARENONE 75 MG
100 CAPSULE ORAL DAILY
COMMUNITY

## 2021-11-07 RX ORDER — DILTIAZEM HYDROCHLORIDE 180 MG/1
180 CAPSULE, COATED, EXTENDED RELEASE ORAL DAILY
COMMUNITY

## 2021-11-07 RX ORDER — LAMOTRIGINE 100 MG/1
50 TABLET ORAL DAILY
COMMUNITY

## 2021-11-07 RX ORDER — GLYBURIDE 2.5 MG/1
2.5 TABLET ORAL
COMMUNITY

## 2021-11-07 RX ORDER — OMEPRAZOLE 20 MG/1
40 CAPSULE, DELAYED RELEASE ORAL DAILY
COMMUNITY

## 2021-11-07 RX ORDER — CLONIDINE HYDROCHLORIDE 0.3 MG/1
0.3 TABLET ORAL 2 TIMES DAILY
COMMUNITY

## 2021-11-07 RX ORDER — GABAPENTIN 300 MG/1
300 CAPSULE ORAL 3 TIMES DAILY
COMMUNITY

## 2021-11-07 RX ORDER — AMLODIPINE BESYLATE 10 MG/1
10 TABLET ORAL DAILY
COMMUNITY

## 2021-11-07 RX ORDER — OXYCODONE HYDROCHLORIDE AND ACETAMINOPHEN 5; 325 MG/1; MG/1
1 TABLET ORAL EVERY 4 HOURS PRN
COMMUNITY

## 2021-11-07 RX ADMIN — LIDOCAINE 1 PATCH: 50 PATCH TOPICAL at 15:39

## 2021-11-07 ASSESSMENT — ENCOUNTER SYMPTOMS
SHORTNESS OF BREATH: 1
CONSTIPATION: 0
VOMITING: 0
EYE PAIN: 0
DIARRHEA: 0
NAUSEA: 1
COUGH: 0
HEADACHES: 0
CHILLS: 0
SORE THROAT: 0
FEVER: 0
FLANK PAIN: 1
ABDOMINAL PAIN: 0
MYALGIAS: 0

## 2021-11-07 ASSESSMENT — PAIN DESCRIPTION - DESCRIPTORS: DESCRIPTORS: ACHING

## 2021-11-07 ASSESSMENT — PAIN DESCRIPTION - PAIN TYPE: TYPE: ACUTE PAIN

## 2021-11-07 NOTE — ED TRIAGE NOTES
"Presents referred by [facility] for further evaluation of LUQ severe abdominal pain referred to his flank with associated intermittent episodes of N/V recurring for the past 4 to 5 days.  He notes \"3 large black rocks\" in his urine, earlier.   Chief Complaint   Patient presents with   • LUQ Pain   • N/V   • Flank Pain     BP (!) 173/88   Pulse 61   Temp 37.1 °C (98.8 °F) (Temporal)   Resp 18   Ht 1.676 m (5' 6\")   Wt 109 kg (240 lb 4.8 oz)   SpO2 95%   BMI 38.79 kg/m²   Has this patient been vaccinated for COVID YES  If not, would they like to be vaccinated while in the ER if eligible?  N/A  Would the patient like to speak with the ERP about the possibility of receiving the COVID vaccine today before making a decision? N/A     "

## 2021-11-07 NOTE — ED PROVIDER NOTES
ED Provider Note    Scribed for Antonia Bhardwaj M.D. by Garth Claudio. 11/7/2021, 3:16 PM.    Primary care provider: Delmy Kessler D.O.  Means of arrival: Walk in  History obtained from: Patient  History limited by: None    CHIEF COMPLAINT  Chief Complaint   Patient presents with   • LUQ Pain   • N/V   • Flank Pain       HPI  Jaden Becerra is a 60 y.o. male who presents to the Emergency Department for evaluation of upper abdominal pain onset three days ago. Patient states he urinated some blood and small black particles yesterday morning. He states his pain occasionally radiates to his back depending on how he lays down or moves. Patient states it feels like someone broke his ribs. He admits to associated symptoms of mild constipation, nausea, but denies trauma, vomiting. Patient denies fever. He has noticed gurgling sensations in his stomach for four weeks. Patient is eating normally. No alleviating factors were reported. He has no history of GI issues. Patient states his oxycodone did not alleviate any pain.    REVIEW OF SYSTEMS  Pertinent positives include rib pain, abdominal pain, mild constipation, nausea, hematuria. Pertinent negatives include no .  All other systems reviewed and negative.    PAST MEDICAL HISTORY       SURGICAL HISTORY  patient denies any surgical history    SOCIAL HISTORY  Social History     Tobacco Use   • Smoking status: Never Smoker   • Smokeless tobacco: Never Used   Vaping Use   • Vaping Use: Never used   Substance Use Topics   • Alcohol use: Not Currently   • Drug use: Never      Social History     Substance and Sexual Activity   Drug Use Never       FAMILY HISTORY  History reviewed. No pertinent family history.    CURRENT MEDICATIONS  Home Medications    **Home medications have not yet been reviewed for this encounter**         ALLERGIES  Allergies   Allergen Reactions   • Cortisone      2000-10-07;hickups w/ injection       PHYSICAL EXAM  VITAL SIGNS: BP (!) 173/88    "Pulse 61   Temp 37.1 °C (98.8 °F) (Temporal)   Resp 18   Ht 1.676 m (5' 6\")   Wt 109 kg (240 lb 4.8 oz)   SpO2 95%   BMI 38.79 kg/m²   Constitutional: Alert in no apparent distress.  HENT: No signs of trauma, Bilateral external ears normal, Nose normal.   Eyes: Pupils are equal and reactive, Conjunctiva normal, Non-icteric.   Neck: Normal range of motion, No tenderness, Supple, No stridor.   Cardiovascular: Regular rate and rhythm, no murmurs.   Thorax & Lungs: Normal breath sounds, No respiratory distress, No wheezing, No chest tenderness.   Abdomen: Bowel sounds normal, Soft,  No masses, No peritoneal signs.  Skin: Warm, Dry, No erythema, No rash.   Back: No bony tenderness, No CVA tenderness.   Musculoskeletal:  Point tenderness to left anterior lower ribs, No major deformities noted.   Neurologic: Alert, moving all extremities without difficulty, no focal deficits.    LABS  Labs Reviewed   CBC WITH DIFFERENTIAL - Abnormal; Notable for the following components:       Result Value    Hemoglobin 13.3 (*)     Hematocrit 41.2 (*)     MCHC 32.3 (*)     MPV 8.9 (*)     All other components within normal limits   COMP METABOLIC PANEL - Abnormal; Notable for the following components:    Glucose 187 (*)     Creatinine 1.52 (*)     All other components within normal limits   URINALYSIS - Abnormal; Notable for the following components:    Glucose 250 (*)     All other components within normal limits   ESTIMATED GFR - Abnormal; Notable for the following components:    GFR If  57 (*)     GFR If Non  47 (*)     All other components within normal limits   LIPASE     All labs reviewed by me.    RADIOLOGY  CT-RENAL COLIC EVALUATION(A/P W/O)   Final Result      1.  No evidence of renal, ureteral or bladder calculi. No hydronephrosis.   2.  Atherosclerotic plaque including coronary artery calcification.   3.  Spleen is at the upper limits of normal in size.   4.  Bibasilar groundglass opacities " "are likely infectious/inflammatory.   5.  2 cm left adrenal adenoma.   6.  Moderate amount of colonic stool.        The radiologist's interpretation of all radiological studies have been reviewed by me.    COURSE & MEDICAL DECISION MAKING  Pertinent Labs & Imaging studies reviewed. (See chart for details)    Differential diagnoses include but are not limited to: Kidney stone, pancreatitis, musculoskeletal pain    3:16 PM - Patient seen and examined at bedside. Patient describes pain to his ribs and abdomen along with nausea. I informed patient of plan to obtain further labs and images. Patient will be treated with lidocaine 5%. Ordered CT-Renal colic, CBC with diff, lipase, UA, estimated GFR to evaluate his symptoms.     4:20 PM - I reevaluated the patient at bedside. The patient informs me they feel improved following lidocaine administration. I discussed the patient's diagnostic study results which show atherosclerotic plaque and moderate constipation. I discussed plan for discharge and follow up as outlined below. The patient verbalizes they feel comfortable going home. The patient is stable for discharge at this time and will return for any new or worsening symptoms. Patient verbalizes understanding and support with my plan for discharge. Patient's last vitals were BP (!) 173/88   Pulse 61   Temp 37.1 °C (98.8 °F) (Temporal)   Resp 18   Ht 1.676 m (5' 6\")   Wt 109 kg (240 lb 4.8 oz)   SpO2 95%   BMI 38.79 kg/m²     HTN/IDDM FOLLOW UP:  The patient is referred to a primary physician for blood pressure management, diabetic screening, and for all other preventive health concerns    Decision Making:  This is a 60 y.o. year old male who presents with left upper quadrant pain. It is quite reproducible on exam. It is more in the lower rib area. It is worse with movement which leads more towards a musculoskeletal cause. There is no rash concerning for zoster. CBC is normal without evidence of left shift. " Urinalysis shows no blood or evidence of UTI. Lipase is normal ruling out pancreatitis. He has some slight renal insufficiency with a creatinine 1.5.    CT scan was performed and there is no evidence of any calculi there is no hydronephrosis. There is some atherosclerosis which the patient knows about. He was told about his spleen being at the upper limits of normal. He was also informed of the groundglass opacities and denies any cough or fevers or anything concerning for Covid like symptoms. He was informed of his left adrenal adenoma which was incidentally found in a moderate amount of colonic stool. It is unclear what exactly is causing the patient's pain the Lidoderm patch does not seem to be helping. Patient does take pain medication at home and can continue to take this and follow-up with his primary care doctor. He was hypertensive at time of discharge but does have a history of this. I do think patient can be discharged with outpatient follow-up he is agreeable.     The patient will return for new or worsening symptoms and is stable at the time of discharge. Patient was given return precautions. Patient and/or family member verbalizes understanding and will comply.    DISPOSITION:  Patient will be discharged home in stable condition.    FOLLOW UP:  Delmy Kessler D.O.  [address] [facility]  [address]  [place] [address] [phone]    Schedule an appointment as soon as possible for a visit       [facility], Emergency Dept  [address] [facility]  [place] [id number] [phone]    Return to the emergency department for worsening pain, vomiting fevers or other concerns.       FINAL IMPRESSION  1. LUQ pain               This dictation has been created using voice recognition software and/or scribes. The accuracy of the dictation is limited by the abilities of the software and the expertise of the scribes. I expect there may be some errors of grammar and possibly content. I  made every attempt to manually correct the errors within my dictation. However, errors related to voice recognition software and/or scribes may still exist and should be interpreted within the appropriate context.     I, Garth Claudio (Scribe), am scribing for, and in the presence of, Antonia Bhardwaj M.D..    Electronically signed by: Garth Claudio (Scribe), 11/7/2021    IAntonia M.D. personally performed the services described in this documentation, as scribed by Garth Claudio in my presence, and it is both accurate and complete.    The note accurately reflects work and decisions made by me.  Antonia Bhardwaj M.D.  11/7/2021  6:08 PM

## 2021-11-07 NOTE — PROGRESS NOTES
"Subjective:   Jaden Becerra is a 60 y.o. male who presents for UTI (x4 days, possible kidney stones, had prostate surgery )      60-year-old male presents with 4 days of left-sided lower chest wall/sharp flank pain that seems to be sporadic, is not reproducible with movement or exertion.  He notes that the pain is an 11 out of 10 when it escalates, sometimes it goes away completely.  During this time period of the last 4 days he is also had some nausea and shortness of breath.  He recently had a significant infection requiring surgical debridement, records are from outside hospital we are unable to access and he does not know the specifics of this.  He reports his symptoms did subside and his scrotal infection did maged.  He has no history of kidney stones but did note some small specks that he passed in the last several days, concerning for clot versus stone.  He has some chronic lower extremity swelling, denies that it is worse or new.  He denies any orthopnea or chest pressure pain.  The pain does not radiate anywhere.  He denies any dysuria, frequency or urgency.  He notes some difficulty initiating his stream.  He has a previous prostatectomy      Review of Systems   Constitutional: Negative for chills and fever.   HENT: Negative for congestion, ear pain and sore throat.    Eyes: Negative for pain.   Respiratory: Positive for shortness of breath. Negative for cough.    Cardiovascular: Positive for chest pain.   Gastrointestinal: Positive for nausea. Negative for abdominal pain, constipation, diarrhea and vomiting.   Genitourinary: Positive for flank pain. Negative for dysuria.   Musculoskeletal: Negative for myalgias.   Skin: Negative for rash.   Neurological: Negative for headaches.       Medications, Allergies, and current problem list reviewed today in Epic.     Objective:     BP (!) 164/82   Pulse 83   Temp 36.7 °C (98.1 °F) (Temporal)   Resp 18   Ht 1.676 m (5' 6\")   Wt 109 kg (240 lb)   SpO2 96% "     Physical Exam  Vitals reviewed.   Constitutional:       Appearance: Normal appearance.   HENT:      Head: Normocephalic and atraumatic.      Right Ear: External ear normal.      Left Ear: External ear normal.      Nose: Nose normal.      Mouth/Throat:      Mouth: Mucous membranes are moist.   Eyes:      Conjunctiva/sclera: Conjunctivae normal.   Cardiovascular:      Rate and Rhythm: Normal rate and regular rhythm.      Heart sounds: Normal heart sounds.   Pulmonary:      Effort: Pulmonary effort is normal.      Breath sounds: Normal breath sounds.   Chest:      Chest wall: No tenderness.   Abdominal:      General: There is distension.      Tenderness: There is no abdominal tenderness. There is no guarding or rebound.   Musculoskeletal:      Right lower leg: Edema present.      Left lower leg: Edema present.   Skin:     General: Skin is warm and dry.      Capillary Refill: Capillary refill takes less than 2 seconds.   Neurological:      Mental Status: He is alert and oriented to person, place, and time.         EKG interpreted by me at 1340 is sinus with a rate of 59, normal intervals, normal axis, normal rhythm.  No sign of ischemia  Assessment/Plan:     Diagnosis and associated orders:     1. Flank pain  POCT Urinalysis    EKG - Clinic Performed      Comments/MDM:     • UA unremarkable aside from 100 mg/Izabela glucose, expected as he is diabetic  • EKG interpreted as normal  At this time the patient has an undifferentiated sharp flank pain as well as shortness of breath and nausea.  He has had a large amount of medical issues this year and has significant comorbidities that could represent a severe intra-abdominal pathology or intrathoracic pathology.  Given his severe pain and these comorbidities I did recommend ER evaluation for consideration of labs, advanced imaging, and further work-up.  Patient was very amenable to this plan, demonstrated appropriate concern and reported he will present immediately to the  emergency room.  He feels stable to drive and I actually agree with him, he has no disequilibrium or signs of decompensation       Differential diagnosis, natural history, supportive care, and indications for immediate follow-up discussed.    Advised the patient to follow-up with the primary care physician for recheck, reevaluation, and consideration of further management.    Please note that this dictation was created using voice recognition software. I have made a reasonable attempt to correct obvious errors, but I expect that there are errors of grammar and possibly content that I did not discover before finalizing the note.    This note was electronically signed by João Castillo PA-C

## 2021-11-08 NOTE — DISCHARGE INSTRUCTIONS
Ct findings:     1.  No evidence of renal, ureteral or bladder calculi. No hydronephrosis.   2.  Atherosclerotic plaque including coronary artery calcification.   3.  Spleen is at the upper limits of normal in size.   4.  Bibasilar groundglass opacities are likely infectious/inflammatory.   5.  2 cm left adrenal adenoma.   6.  Moderate amount of colonic stool.

## 2021-11-08 NOTE — ED NOTES
IV discontinued with cathlon intact    Discharge home with instructions and follow up care reviewed and given to patient with verbal understanding.      Ambulatory with a steady gait and stable condition

## 2023-04-30 ENCOUNTER — OFFICE VISIT (OUTPATIENT)
Dept: URGENT CARE | Facility: CLINIC | Age: 63
End: 2023-04-30
Payer: COMMERCIAL

## 2023-04-30 ENCOUNTER — APPOINTMENT (OUTPATIENT)
Dept: RADIOLOGY | Facility: IMAGING CENTER | Age: 63
End: 2023-04-30
Payer: COMMERCIAL

## 2023-04-30 VITALS
HEIGHT: 66 IN | WEIGHT: 240 LBS | SYSTOLIC BLOOD PRESSURE: 124 MMHG | BODY MASS INDEX: 38.57 KG/M2 | TEMPERATURE: 98.4 F | HEART RATE: 57 BPM | RESPIRATION RATE: 16 BRPM | DIASTOLIC BLOOD PRESSURE: 66 MMHG | OXYGEN SATURATION: 96 %

## 2023-04-30 DIAGNOSIS — M25.572 ACUTE LEFT ANKLE PAIN: ICD-10-CM
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
## 2023-04-30 DIAGNOSIS — S93.402A SPRAIN OF LEFT ANKLE, UNSPECIFIED LIGAMENT, INITIAL ENCOUNTER: ICD-10-CM

## 2023-04-30 PROCEDURE — 73610 X-RAY EXAM OF ANKLE: CPT | Mod: TC,FY,LT | Performed by: RADIOLOGY

## 2023-04-30 PROCEDURE — 99213 OFFICE O/P EST LOW 20 MIN: CPT

## 2023-04-30 RX ORDER — CARVEDILOL 6.25 MG/1
TABLET ORAL
COMMUNITY
Start: 2023-04-24

## 2023-04-30 RX ORDER — MIRTAZAPINE 15 MG/1
15 TABLET, FILM COATED ORAL
COMMUNITY

## 2023-04-30 RX ORDER — VALSARTAN 320 MG/1
320 TABLET ORAL
COMMUNITY
Start: 2023-02-14

## 2023-04-30 RX ORDER — AMLODIPINE BESYLATE 10 MG/1
1 TABLET ORAL
COMMUNITY
Start: 2023-01-31

## 2023-04-30 RX ORDER — TEMAZEPAM 15 MG/1
CAPSULE ORAL
COMMUNITY
Start: 2023-04-07

## 2023-04-30 RX ORDER — SERTRALINE HYDROCHLORIDE 100 MG/1
200 TABLET, FILM COATED ORAL DAILY
COMMUNITY

## 2023-04-30 RX ORDER — NITROGLYCERIN 0.4 MG/1
0.4 TABLET SUBLINGUAL
COMMUNITY
Start: 2022-11-02

## 2023-04-30 RX ORDER — MIRTAZAPINE 15 MG/1
TABLET, FILM COATED ORAL
COMMUNITY
Start: 2023-03-21

## 2023-04-30 RX ORDER — ASPIRIN 81 MG/1
81 TABLET ORAL DAILY
COMMUNITY

## 2023-04-30 RX ORDER — ISOSORBIDE MONONITRATE 30 MG/1
30 TABLET, EXTENDED RELEASE ORAL
COMMUNITY
Start: 2023-02-01

## 2023-04-30 RX ORDER — OMEPRAZOLE 40 MG/1
40 CAPSULE, DELAYED RELEASE ORAL DAILY
COMMUNITY

## 2023-04-30 RX ORDER — GLYBURIDE 2.5 MG/1
2.5 TABLET ORAL
COMMUNITY

## 2023-04-30 RX ORDER — EZETIMIBE 10 MG/1
TABLET ORAL
COMMUNITY
Start: 2023-04-11

## 2023-04-30 RX ORDER — SPIRONOLACTONE 25 MG/1
25 TABLET ORAL
COMMUNITY
Start: 2023-02-22

## 2023-04-30 RX ORDER — AMIODARONE HYDROCHLORIDE 200 MG/1
200 TABLET ORAL DAILY
COMMUNITY

## 2023-04-30 RX ORDER — ATORVASTATIN CALCIUM 80 MG/1
80 TABLET, FILM COATED ORAL
COMMUNITY
Start: 2023-03-13

## 2023-04-30 RX ORDER — EZETIMIBE 10 MG/1
10 TABLET ORAL DAILY
COMMUNITY
Start: 2023-01-10 | End: 2023-10-07

## 2023-04-30 RX ORDER — LAMOTRIGINE 200 MG/1
200 TABLET ORAL
COMMUNITY

## 2023-04-30 RX ORDER — ISOSORBIDE MONONITRATE 30 MG/1
30 TABLET, EXTENDED RELEASE ORAL DAILY
COMMUNITY
Start: 2022-11-02 | End: 2023-11-02

## 2023-04-30 RX ORDER — CLONAZEPAM 0.5 MG/1
0.5 TABLET ORAL
COMMUNITY

## 2023-04-30 RX ORDER — ARIPIPRAZOLE 5 MG/1
20 TABLET ORAL DAILY
COMMUNITY

## 2023-04-30 RX ORDER — LAMOTRIGINE 25 MG/1
TABLET ORAL
COMMUNITY
Start: 2023-03-28

## 2023-04-30 RX ORDER — ARIPIPRAZOLE 20 MG/1
TABLET ORAL
COMMUNITY
Start: 2023-03-13

## 2023-04-30 RX ORDER — ATORVASTATIN CALCIUM 80 MG/1
1 TABLET, FILM COATED ORAL
COMMUNITY
Start: 2023-04-24

## 2023-04-30 RX ORDER — BUMETANIDE 2 MG/1
TABLET ORAL
COMMUNITY
Start: 2023-02-11

## 2023-04-30 RX ORDER — GABAPENTIN 300 MG/1
300 CAPSULE ORAL
COMMUNITY

## 2023-04-30 RX ORDER — APIXABAN 2.5 MG/1
0.5 TABLET, FILM COATED ORAL
COMMUNITY
Start: 2023-02-20

## 2023-04-30 ASSESSMENT — ENCOUNTER SYMPTOMS
WEAKNESS: 0
FALLS: 1
FEVER: 0
LOSS OF CONSCIOUSNESS: 0
SENSORY CHANGE: 0
SHORTNESS OF BREATH: 0
CHILLS: 0

## 2023-04-30 ASSESSMENT — FIBROSIS 4 INDEX: FIB4 SCORE: 0.69

## 2023-04-30 NOTE — PROGRESS NOTES
Subjective:     CHIEF COMPLAINT  Chief Complaint   Patient presents with   • Ankle Injury     X6days, Left ankle, patient tripped over a rock, swelling, bruising,        HPI  Jaden Becerra is a very pleasant 62 y.o. male who presents patient reports that 6 days ago he tripped walking and twisted his left ankle.  He was able to walk immediately after, but started to have extreme pain later in the day.  At this point, he was unable to weight-bear.  He has been using a cane in order to ambulate and has been wearing a brace that he purchased.  He denies changes in sensation or strength.  He denies head trauma, loss of consciousness.  Otherwise is well.    REVIEW OF SYSTEMS  Review of Systems   Constitutional:  Negative for chills, fever and malaise/fatigue.   Respiratory:  Negative for shortness of breath.    Cardiovascular:  Negative for chest pain.   Musculoskeletal:  Positive for falls.   Neurological:  Negative for sensory change, loss of consciousness and weakness.     PAST MEDICAL HISTORY  Patient Active Problem List    Diagnosis Date Noted   • Gout 09/08/2012   • Major depressive disorder, recurrent episode (HCC) 07/20/2012   • Obstructive sleep apnea 09/22/2011   • Asthma 09/21/2011   • Prostate cancer (Formerly [facility]) 02/18/2011   • Degenerative spondylolisthesis 04/14/2010   • Chronic pain 11/03/2008   • Hx of percutaneous transluminal coronary angioplasty 08/18/2008   • CAD (coronary artery disease) 08/01/2008   • Prediabetes 09/25/2006   • Hyperlipidemia 03/14/2000   • Obesity 02/11/2000       SURGICAL HISTORY  patient denies any surgical history    ALLERGIES  Allergies   Allergen Reactions   • Metoprolol Anaphylaxis   • Cortisone      2000-10-07;hickups w/ injection       CURRENT MEDICATIONS  Home Medications       Reviewed by Margret Guerra PLeoncioA.-C. (Physician Assistant) on 04/30/23 at 0922  Med List Status: <None>     Medication Last Dose Status   amiodarone (CORDARONE) 200 MG Tab Taking Active   amLODIPine  (NORVASC) 10 MG Tab Taking Active   amLODIPine (NORVASC) 10 MG Tab Not Taking Active   apixaban (ELIQUIS) 5mg Tab Not Taking Active   aripiprazole (ABILIFY) 20 MG tablet Taking Active   ARIPiprazole (ABILIFY) 5 MG tablet Not Taking Active   aspirin 81 MG EC tablet Taking Active   aspirin EC (ECOTRIN) 325 MG Tablet Delayed Response Not Taking Active   atorvastatin (LIPITOR) 20 MG Tab Not Taking Active   atorvastatin (LIPITOR) 80 MG tablet Taking Active   atorvastatin (LIPITOR) 80 MG tablet Not Taking Active   bumetanide (BUMEX) 2 MG tablet Taking Active   carvedilol (COREG) 6.25 MG Tab Not Taking Active   carvedilol (COREG) 6.25 MG Tab Taking Active   Cholecalciferol (D3 PO) Taking Active   Cholecalciferol 2000 UNIT Tab Not Taking Active   clonazePAM (KLONOPIN) 0.5 MG Tab Taking Active   cloNIDine (CATAPRES) 0.3 MG Tab Not Taking Active   Cyanocobalamin (B-12 PO) Taking Active   cyanocobalamin (VITAMIN B-12) 100 MCG Tab Not Taking Active   cyanocobalamin 100 MCG tablet Not Taking Active   DILTIAZem CD (CARDIZEM CD) 180 MG CAPSULE SR 24 HR Not Taking Active   ELIQUIS 2.5 MG Tab Taking Differently Active   ezetimibe (ZETIA) 10 MG Tab Not Taking Active   ezetimibe (ZETIA) 10 MG Tab Not Taking Active   fenofibrate (TRIGLIDE) 160 MG tablet Taking Active   gabapentin (NEURONTIN) 300 MG Cap Taking Active   gabapentin (NEURONTIN) 300 MG Cap Not Taking Active   glyBURIDE (DIABETA) 2.5 MG Tab Taking Active   glyBURIDE (DIABETA) 2.5 MG Tab Not Taking Active   isosorbide mononitrate SR (IMDUR) 30 MG TABLET SR 24 HR Taking Active   isosorbide mononitrate SR (IMDUR) 30 MG TABLET SR 24 HR Not Taking Active   lamoTRIgine (LAMICTAL) 100 MG Tab Taking Differently Active   lamotrigine (LAMICTAL) 200 MG tablet Taking Active   lamoTRIgine (LAMICTAL) 25 MG Tab Not Taking Active   LAMOTRIGINE PO Not Taking Active   mirtazapine (REMERON) 15 MG Tab Not Taking Active   mirtazapine (REMERON) 15 MG Tab Taking Active   nitroglycerin (NITROSTAT)  "0.4 MG SL Tab Not Taking Active   omeprazole (PRILOSEC) 20 MG delayed-release capsule Not Taking Active   omeprazole (PRILOSEC) 40 MG delayed-release capsule Taking Active   oxyCODONE-acetaminophen (PERCOCET) 5-325 MG Tab Not Taking Active   sertraline (ZOLOFT) 100 MG Tab Taking Differently Active   sertraline (ZOLOFT) 100 MG Tab Not Taking Active   spironolactone (ALDACTONE) 25 MG Tab Taking Active   temazepam (RESTORIL) 15 MG Cap Not Taking Active   valsartan (DIOVAN) 320 MG tablet Taking Active   vitamin D2, Ergocalciferol, (DRISDOL) 1.25 MG (78559 UT) Cap capsule Not Taking Active                    SOCIAL HISTORY  Social History     Tobacco Use   • Smoking status: Never   • Smokeless tobacco: Current     Types: Chew   Vaping Use   • Vaping Use: Never used   Substance and Sexual Activity   • Alcohol use: Not Currently   • Drug use: Never   • Sexual activity: Not on file       FAMILY HISTORY  History reviewed. No pertinent family history.       Objective:     VITAL SIGNS: /66   Pulse (!) 57   Temp 36.9 °C (98.4 °F) (Temporal)   Resp 16   Ht 1.676 m (5' 6\")   Wt 109 kg (240 lb)   SpO2 96%   BMI 38.74 kg/m²     PHYSICAL EXAM  Physical Exam  Constitutional:       General: He is not in acute distress.     Appearance: Normal appearance. He is not ill-appearing or toxic-appearing.   HENT:      Head: Normocephalic and atraumatic.      Mouth/Throat:      Mouth: Mucous membranes are moist.   Eyes:      Conjunctiva/sclera: Conjunctivae normal.   Pulmonary:      Effort: Pulmonary effort is normal.   Musculoskeletal:         General: Swelling (L ankle swelling medial and laterally) and tenderness (TTP on medial malleolus) present.      Comments: Pedal pulses 2+  Sensation fully intact  No skin changes, erythema, bruising.    Skin:     General: Skin is warm and dry.      Capillary Refill: Capillary refill takes less than 2 seconds.   Neurological:      General: No focal deficit present.      Mental Status: He is " alert and oriented to person, place, and time.   Psychiatric:         Mood and Affect: Mood normal.       Assessment/Plan:     1. Acute left ankle pain  - DX-ANKLE 3+ VIEWS LEFT; Future    2. Fall from ground level  - DX-ANKLE 3+ VIEWS LEFT; Future    3. Sprain of left ankle, unspecified ligament, initial encounter    Other orders  - LAMOTRIGINE PO; Take 50 mg by mouth. (Patient not taking: Reported on 4/30/2023)  - amiodarone (CORDARONE) 200 MG Tab; Take 200 mg by mouth every day.  - amLODIPine (NORVASC) 10 MG Tab; Take 1 Tablet by mouth every day. (Patient not taking: Reported on 4/30/2023)  - ARIPiprazole (ABILIFY) 5 MG tablet; Take 20 mg by mouth every day. (Patient not taking: Reported on 4/30/2023)  - aripiprazole (ABILIFY) 20 MG tablet; TAKE 1 TABLET BY MOUTH AT BEDTIME TAKE IN ADDITION TO 5MG PILL (25MG TOTAL/DAY)  - aspirin 81 MG EC tablet; Take 81 mg by mouth every day.  - atorvastatin (LIPITOR) 80 MG tablet; Take 1 Tablet by mouth every day.  - atorvastatin (LIPITOR) 80 MG tablet; Take 80 mg by mouth every day. (Patient not taking: Reported on 4/30/2023)  - bumetanide (BUMEX) 2 MG tablet; TAKE TWO 4MG (8MG) TABLETS DAILY FOR 5 DAYS A WEEK, AND FIVE 2MG (10MG) TABLETS 2 DAYS A WEEK  - carvedilol (COREG) 6.25 MG Tab;  (Patient not taking: Reported on 4/30/2023)  - carvedilol (COREG) 6.25 MG Tab; TAKE 1 TABLET (6.25 MG TOTAL) BY MOUTH TWICE A DAY  - Cholecalciferol 2000 UNIT Tab; Take  by mouth. (Patient not taking: Reported on 4/30/2023)  - clonazePAM (KLONOPIN) 0.5 MG Tab; Take 0.5 mg by mouth.  - cyanocobalamin 100 MCG tablet; Take 1,000 mcg by mouth. (Patient not taking: Reported on 4/30/2023)  - ezetimibe (ZETIA) 10 MG Tab; Take 10 mg by mouth every day. (Patient not taking: Reported on 4/30/2023)  - ezetimibe (ZETIA) 10 MG Tab; TAKE 1 TABLET (10 MG TOTAL) BY MOUTH DAILY  DAYS (Patient not taking: Reported on 4/30/2023)  - gabapentin (NEURONTIN) 300 MG Cap; Take 300 mg by mouth. (Patient not  taking: Reported on 4/30/2023)  - isosorbide mononitrate SR (IMDUR) 30 MG TABLET SR 24 HR; Take 30 mg by mouth every day.  - isosorbide mononitrate SR (IMDUR) 30 MG TABLET SR 24 HR; Take 30 mg by mouth every day. (Patient not taking: Reported on 4/30/2023)  - lamotrigine (LAMICTAL) 200 MG tablet; Take 200 mg by mouth.  - lamoTRIgine (LAMICTAL) 25 MG Tab; PLEASE SEE ATTACHED FOR DETAILED DIRECTIONS (Patient not taking: Reported on 4/30/2023)  - mirtazapine (REMERON) 15 MG Tab; Take 15 mg by mouth. (Patient not taking: Reported on 4/30/2023)  - mirtazapine (REMERON) 15 MG Tab; TAKE 1/2 TO 1 TABLET BY MOUTH AT BEDTIME AS NEEDED FOR INSOMNIA  - nitroglycerin (NITROSTAT) 0.4 MG SL Tab; Place 0.4 mg under the tongue. (Patient not taking: Reported on 4/30/2023)  - omeprazole (PRILOSEC) 40 MG delayed-release capsule; Take 40 mg by mouth every day.  - sertraline (ZOLOFT) 100 MG Tab; Take 200 mg by mouth every day. (Patient not taking: Reported on 4/30/2023)  - spironolactone (ALDACTONE) 25 MG Tab; Take 25 mg by mouth every day.  - temazepam (RESTORIL) 15 MG Cap; TAKE 1 CAPSULE AT BEDTIME AS NEEDED FOR INSOMNIA ORALLY ONCE A DAY 90 DAYS (Patient not taking: Reported on 4/30/2023)  - valsartan (DIOVAN) 320 MG tablet; Take 320 mg by mouth every day.  - ELIQUIS 2.5 MG Tab; 0.5 mg.  - glyBURIDE (DIABETA) 2.5 MG Tab; Take 2.5 mg by mouth. (Patient not taking: Reported on 4/30/2023)  - Cyanocobalamin (B-12 PO); Take  by mouth.  - Cholecalciferol (D3 PO); Take 50 mcg by mouth.  -Elevate left ankle, apply ice, and rest  -Tylenol as needed for pain    Results:  Normal findings. No osseous abnormalities  MDM/Comments:  Patient has stable vital signs and is non-toxic appearing. Discussed supportive care with rest, ice, elevation, and compression. Use of tylenol/Ibuprofen as needed for pain.  I personally reviewed x-ray images and agree with radiologist interpretation.  Patient demonstrated understanding of treatment plan at this  time.    Differential diagnosis, natural history, supportive care, and indications for immediate follow-up discussed. All questions answered. Patient agrees with the plan of care.    Follow-up as needed if symptoms worsen or fail to improve to PCP, Urgent care or Emergency Room.    I have personally reviewed prior external notes and test results pertinent to today's visit.  I have independently reviewed and interpreted all diagnostics ordered during this urgent care acute visit.   Discussed management options (risks,benefits, and alternatives to treatment). Pt expresses understanding and the treatment plan was agreed upon. Questions were encouraged and answered to pt's satisfaction.    Please note that this dictation was created using voice recognition software. I have made a reasonable attempt to correct obvious errors, but I expect that there are errors of grammar and possibly content that I did not discover before finalizing the note.

## 2023-08-10 ENCOUNTER — OFFICE VISIT (OUTPATIENT)
Dept: URGENT CARE | Facility: CLINIC | Age: 63
End: 2023-08-10
Payer: COMMERCIAL

## 2023-08-10 VITALS
DIASTOLIC BLOOD PRESSURE: 88 MMHG | RESPIRATION RATE: 20 BRPM | WEIGHT: 235 LBS | OXYGEN SATURATION: 97 % | SYSTOLIC BLOOD PRESSURE: 134 MMHG | BODY MASS INDEX: 40.12 KG/M2 | HEIGHT: 64 IN | TEMPERATURE: 97.1 F | HEART RATE: 60 BPM

## 2023-08-10 DIAGNOSIS — B02.9 HERPES ZOSTER WITHOUT COMPLICATION: ICD-10-CM

## 2023-08-10 DIAGNOSIS — R20.8 SKIN PAIN: ICD-10-CM

## 2023-08-10 PROCEDURE — 3075F SYST BP GE 130 - 139MM HG: CPT | Performed by: NURSE PRACTITIONER

## 2023-08-10 PROCEDURE — 99213 OFFICE O/P EST LOW 20 MIN: CPT | Performed by: NURSE PRACTITIONER

## 2023-08-10 PROCEDURE — 3079F DIAST BP 80-89 MM HG: CPT | Performed by: NURSE PRACTITIONER

## 2023-08-10 RX ORDER — LIDOCAINE 5% 5 G/100G
1 CREAM TOPICAL EVERY 8 HOURS PRN
Qty: 30 G | Refills: 0 | Status: SHIPPED | OUTPATIENT
Start: 2023-08-10

## 2023-08-10 RX ORDER — VALACYCLOVIR HYDROCHLORIDE 1 G/1
1000 TABLET, FILM COATED ORAL 3 TIMES DAILY
Qty: 21 TABLET | Refills: 0 | Status: SHIPPED | OUTPATIENT
Start: 2023-08-10 | End: 2023-08-17

## 2023-08-10 ASSESSMENT — ENCOUNTER SYMPTOMS
RESPIRATORY NEGATIVE: 1
CHILLS: 0
CARDIOVASCULAR NEGATIVE: 1
NEUROLOGICAL NEGATIVE: 1
CONSTITUTIONAL NEGATIVE: 1
FEVER: 0

## 2023-08-10 ASSESSMENT — FIBROSIS 4 INDEX: FIB4 SCORE: 0.69

## 2023-08-10 ASSESSMENT — VISUAL ACUITY: OU: 1

## 2023-08-10 NOTE — PROGRESS NOTES
Subjective:     Jaden Becerra is a 62 y.o. male who presents for Rash (X4 days,left side of ribs area, itches concerns about possible shingles )       Rash  This is a new problem. The problem has been gradually worsening since onset. Pertinent negatives include no fever.     Patient reports 4 days ago, he started to develop new onset of red, itchy, painful bumps at the left side of his chest expanding laterally to his back.    Denies history of shingles.  Has had chickenpox in the past.  Reports he has not had the shingles vaccine.    Review of Systems   Constitutional: Negative.  Negative for chills, fever and malaise/fatigue.   Respiratory: Negative.     Cardiovascular: Negative.    Skin:  Positive for itching and rash.   Neurological: Negative.    All other systems reviewed and are negative.    Refer to HPI for additional details.    During this visit, appropriate PPE was worn, and hand hygiene was performed.    PMH:  has no past medical history on file.    MEDS:   Current Outpatient Medications:     valacyclovir (VALTREX) 1 GM Tab, Take 1 Tablet by mouth 3 times a day for 7 days., Disp: 21 Tablet, Rfl: 0    Lidocaine 5 % Cream, Apply 1 Application topically every 8 hours as needed (Skin pain). Use thin layer to intact skin., Disp: 30 g, Rfl: 0    LAMOTRIGINE PO, Take 50 mg by mouth. (Patient not taking: Reported on 4/30/2023), Disp: , Rfl:     amiodarone (CORDARONE) 200 MG Tab, Take 200 mg by mouth every day., Disp: , Rfl:     amLODIPine (NORVASC) 10 MG Tab, Take 1 Tablet by mouth every day. (Patient not taking: Reported on 4/30/2023), Disp: , Rfl:     ARIPiprazole (ABILIFY) 5 MG tablet, Take 20 mg by mouth every day. (Patient not taking: Reported on 4/30/2023), Disp: , Rfl:     aripiprazole (ABILIFY) 20 MG tablet, TAKE 1 TABLET BY MOUTH AT BEDTIME TAKE IN ADDITION TO 5MG PILL (25MG TOTAL/DAY), Disp: , Rfl:     aspirin 81 MG EC tablet, Take 81 mg by mouth every day., Disp: , Rfl:     atorvastatin (LIPITOR) 80  MG tablet, Take 1 Tablet by mouth every day., Disp: , Rfl:     atorvastatin (LIPITOR) 80 MG tablet, Take 80 mg by mouth every day. (Patient not taking: Reported on 4/30/2023), Disp: , Rfl:     bumetanide (BUMEX) 2 MG tablet, TAKE TWO 4MG (8MG) TABLETS DAILY FOR 5 DAYS A WEEK, AND FIVE 2MG (10MG) TABLETS 2 DAYS A WEEK, Disp: , Rfl:     carvedilol (COREG) 6.25 MG Tab, , Disp: , Rfl:     carvedilol (COREG) 6.25 MG Tab, TAKE 1 TABLET (6.25 MG TOTAL) BY MOUTH TWICE A DAY, Disp: , Rfl:     Cholecalciferol 2000 UNIT Tab, Take  by mouth. (Patient not taking: Reported on 4/30/2023), Disp: , Rfl:     clonazePAM (KLONOPIN) 0.5 MG Tab, Take 0.5 mg by mouth., Disp: , Rfl:     cyanocobalamin 100 MCG tablet, Take 1,000 mcg by mouth. (Patient not taking: Reported on 4/30/2023), Disp: , Rfl:     ezetimibe (ZETIA) 10 MG Tab, Take 10 mg by mouth every day. (Patient not taking: Reported on 4/30/2023), Disp: , Rfl:     ezetimibe (ZETIA) 10 MG Tab, TAKE 1 TABLET (10 MG TOTAL) BY MOUTH DAILY  DAYS (Patient not taking: Reported on 4/30/2023), Disp: , Rfl:     gabapentin (NEURONTIN) 300 MG Cap, Take 300 mg by mouth. (Patient not taking: Reported on 4/30/2023), Disp: , Rfl:     isosorbide mononitrate SR (IMDUR) 30 MG TABLET SR 24 HR, Take 30 mg by mouth every day., Disp: , Rfl:     isosorbide mononitrate SR (IMDUR) 30 MG TABLET SR 24 HR, Take 30 mg by mouth every day. (Patient not taking: Reported on 4/30/2023), Disp: , Rfl:     lamotrigine (LAMICTAL) 200 MG tablet, Take 200 mg by mouth., Disp: , Rfl:     lamoTRIgine (LAMICTAL) 25 MG Tab, PLEASE SEE ATTACHED FOR DETAILED DIRECTIONS (Patient not taking: Reported on 4/30/2023), Disp: , Rfl:     mirtazapine (REMERON) 15 MG Tab, Take 15 mg by mouth. (Patient not taking: Reported on 4/30/2023), Disp: , Rfl:     mirtazapine (REMERON) 15 MG Tab, TAKE 1/2 TO 1 TABLET BY MOUTH AT BEDTIME AS NEEDED FOR INSOMNIA, Disp: , Rfl:     nitroglycerin (NITROSTAT) 0.4 MG SL Tab, Place 0.4 mg under the  tongue. (Patient not taking: Reported on 4/30/2023), Disp: , Rfl:     omeprazole (PRILOSEC) 40 MG delayed-release capsule, Take 40 mg by mouth every day., Disp: , Rfl:     sertraline (ZOLOFT) 100 MG Tab, Take 200 mg by mouth every day. (Patient not taking: Reported on 4/30/2023), Disp: , Rfl:     spironolactone (ALDACTONE) 25 MG Tab, Take 25 mg by mouth every day., Disp: , Rfl:     temazepam (RESTORIL) 15 MG Cap, TAKE 1 CAPSULE AT BEDTIME AS NEEDED FOR INSOMNIA ORALLY ONCE A DAY 90 DAYS (Patient not taking: Reported on 4/30/2023), Disp: , Rfl:     valsartan (DIOVAN) 320 MG tablet, Take 320 mg by mouth every day., Disp: , Rfl:     ELIQUIS 2.5 MG Tab, 0.5 mg., Disp: , Rfl:     glyBURIDE (DIABETA) 2.5 MG Tab, Take 2.5 mg by mouth. (Patient not taking: Reported on 4/30/2023), Disp: , Rfl:     Cyanocobalamin (B-12 PO), Take  by mouth., Disp: , Rfl:     Cholecalciferol (D3 PO), Take 50 mcg by mouth., Disp: , Rfl:     aspirin EC (ECOTRIN) 325 MG Tablet Delayed Response, Take 81 mg by mouth. (Patient not taking: Reported on 4/30/2023), Disp: , Rfl:     apixaban (ELIQUIS) 5mg Tab, Take 5 mg by mouth 2 times a day. (Patient not taking: Reported on 4/30/2023), Disp: , Rfl:     amLODIPine (NORVASC) 10 MG Tab, Take 10 mg by mouth every day., Disp: , Rfl:     atorvastatin (LIPITOR) 20 MG Tab, Take 40 mg by mouth every evening. (Patient not taking: Reported on 4/30/2023), Disp: , Rfl:     cloNIDine (CATAPRES) 0.3 MG Tab, Take 0.3 mg by mouth 2 times a day. (Patient not taking: Reported on 4/30/2023), Disp: , Rfl:     DILTIAZem CD (CARDIZEM CD) 180 MG CAPSULE SR 24 HR, Take 180 mg by mouth every day. (Patient not taking: Reported on 4/30/2023), Disp: , Rfl:     fenofibrate (TRIGLIDE) 160 MG tablet, Take 160 mg by mouth every day., Disp: , Rfl:     gabapentin (NEURONTIN) 300 MG Cap, Take 300 mg by mouth 3 times a day., Disp: , Rfl:     glyBURIDE (DIABETA) 2.5 MG Tab, Take 2.5 mg by mouth every morning with breakfast., Disp: , Rfl:  "    lamoTRIgine (LAMICTAL) 100 MG Tab, Take 50 mg by mouth every day., Disp: , Rfl:     omeprazole (PRILOSEC) 20 MG delayed-release capsule, Take 40 mg by mouth every day. (Patient not taking: Reported on 4/30/2023), Disp: , Rfl:     oxyCODONE-acetaminophen (PERCOCET) 5-325 MG Tab, Take 1 Tablet by mouth every four hours as needed. (Patient not taking: Reported on 4/30/2023), Disp: , Rfl:     sertraline (ZOLOFT) 100 MG Tab, Take 200 mg by mouth every day., Disp: , Rfl:     vitamin D2, Ergocalciferol, (DRISDOL) 1.25 MG (63689 UT) Cap capsule, Take  by mouth every 7 days. (Patient not taking: Reported on 4/30/2023), Disp: , Rfl:     cyanocobalamin (VITAMIN B-12) 100 MCG Tab, Take 100 mcg by mouth every day. (Patient not taking: Reported on 4/30/2023), Disp: , Rfl:     ALLERGIES:   Allergies   Allergen Reactions    Metoprolol Anaphylaxis    Cortisone      2000-10-07;hickups w/ injection     SURGHX: History reviewed. No pertinent surgical history.  SOCHX:  reports that he has never smoked. His smokeless tobacco use includes chew. He reports that he does not currently use alcohol. He reports that he does not use drugs.    FH: Per HPI as applicable/pertinent.      Objective:     /88   Pulse 60   Temp 36.2 °C (97.1 °F) (Temporal)   Resp 20   Ht 1.626 m (5' 4\")   Wt 107 kg (235 lb)   SpO2 97%   BMI 40.34 kg/m²     Physical Exam  Nursing note reviewed.   Constitutional:       General: He is not in acute distress.     Appearance: He is well-developed. He is not ill-appearing or toxic-appearing.   Eyes:      General: Vision grossly intact.   Cardiovascular:      Rate and Rhythm: Normal rate.   Pulmonary:      Effort: Pulmonary effort is normal. No respiratory distress.   Musculoskeletal:         General: No deformity. Normal range of motion.   Skin:     General: Skin is warm and dry.      Coloration: Skin is not pale.      Findings: Rash present.      Comments: Erythematous, papular and vesicular clusters with " erythematous base at left anterior chest wall, lateral chest, and back stopping before midline; appears to follow T5   Neurological:      Mental Status: He is alert and oriented to person, place, and time.      Motor: No weakness.   Psychiatric:         Behavior: Behavior normal. Behavior is cooperative.       Assessment/Plan:     1. Herpes zoster without complication  - valacyclovir (VALTREX) 1 GM Tab; Take 1 Tablet by mouth 3 times a day for 7 days.  Dispense: 21 Tablet; Refill: 0    2. Skin pain  - Lidocaine 5 % Cream; Apply 1 Application topically every 8 hours as needed (Skin pain). Use thin layer to intact skin.  Dispense: 30 g; Refill: 0    Rx as above sent electronically. OTC Tylenol PRN.    Advised to keep lesions covered until crusted over.  Discussed hand hygiene.    Advised to consider shingles vaccine after resolution.    Vital signs stable, afebrile, no acute distress at this time. Warning signs reviewed. Return precautions discussed.     Differential diagnosis, natural history, supportive care, over-the-counter symptom management per 's instructions, close monitoring, and indications for immediate follow-up discussed.    All questions answered. Patient agrees with the plan of care.

## 2023-10-06 ENCOUNTER — APPOINTMENT (OUTPATIENT)
Dept: RADIOLOGY | Facility: MEDICAL CENTER | Age: 63
End: 2023-10-06
Attending: STUDENT IN AN ORGANIZED HEALTH CARE EDUCATION/TRAINING PROGRAM
Payer: COMMERCIAL

## 2023-10-06 ENCOUNTER — HOSPITAL ENCOUNTER (EMERGENCY)
Facility: MEDICAL CENTER | Age: 63
End: 2023-10-06
Attending: STUDENT IN AN ORGANIZED HEALTH CARE EDUCATION/TRAINING PROGRAM
Payer: COMMERCIAL

## 2023-10-06 VITALS
HEART RATE: 60 BPM | SYSTOLIC BLOOD PRESSURE: 92 MMHG | RESPIRATION RATE: 16 BRPM | BODY MASS INDEX: 38.62 KG/M2 | TEMPERATURE: 97.1 F | OXYGEN SATURATION: 94 % | DIASTOLIC BLOOD PRESSURE: 53 MMHG | WEIGHT: 240.3 LBS | HEIGHT: 66 IN

## 2023-10-06 DIAGNOSIS — R11.2 NAUSEA AND VOMITING, UNSPECIFIED VOMITING TYPE: ICD-10-CM

## 2023-10-06 DIAGNOSIS — N18.9 CHRONIC KIDNEY DISEASE, UNSPECIFIED CKD STAGE: ICD-10-CM

## 2023-10-06 DIAGNOSIS — R31.9 HEMATURIA, UNSPECIFIED TYPE: ICD-10-CM

## 2023-10-06 LAB
ALBUMIN SERPL BCP-MCNC: 4.2 G/DL (ref 3.2–4.9)
ALBUMIN/GLOB SERPL: 1.4 G/DL
ALP SERPL-CCNC: 54 U/L (ref 30–99)
ALT SERPL-CCNC: 14 U/L (ref 2–50)
ANION GAP SERPL CALC-SCNC: 10 MMOL/L (ref 7–16)
ANION GAP SERPL CALC-SCNC: 12 MMOL/L (ref 7–16)
APPEARANCE UR: CLEAR
AST SERPL-CCNC: 15 U/L (ref 12–45)
BASOPHILS # BLD AUTO: 0.7 % (ref 0–1.8)
BASOPHILS # BLD: 0.04 K/UL (ref 0–0.12)
BILIRUB SERPL-MCNC: 0.4 MG/DL (ref 0.1–1.5)
BILIRUB UR QL STRIP.AUTO: NEGATIVE
BUN SERPL-MCNC: 44 MG/DL (ref 8–22)
BUN SERPL-MCNC: 44 MG/DL (ref 8–22)
CALCIUM ALBUM COR SERPL-MCNC: 9.5 MG/DL (ref 8.5–10.5)
CALCIUM SERPL-MCNC: 9.2 MG/DL (ref 8.5–10.5)
CALCIUM SERPL-MCNC: 9.7 MG/DL (ref 8.5–10.5)
CHLORIDE SERPL-SCNC: 101 MMOL/L (ref 96–112)
CHLORIDE SERPL-SCNC: 103 MMOL/L (ref 96–112)
CO2 SERPL-SCNC: 23 MMOL/L (ref 20–33)
CO2 SERPL-SCNC: 23 MMOL/L (ref 20–33)
COLOR UR: YELLOW
CREAT SERPL-MCNC: 3.43 MG/DL (ref 0.5–1.4)
CREAT SERPL-MCNC: 3.68 MG/DL (ref 0.5–1.4)
EKG IMPRESSION: NORMAL
EOSINOPHIL # BLD AUTO: 0.15 K/UL (ref 0–0.51)
EOSINOPHIL NFR BLD: 2.6 % (ref 0–6.9)
ERYTHROCYTE [DISTWIDTH] IN BLOOD BY AUTOMATED COUNT: 51.8 FL (ref 35.9–50)
GFR SERPLBLD CREATININE-BSD FMLA CKD-EPI: 18 ML/MIN/1.73 M 2
GFR SERPLBLD CREATININE-BSD FMLA CKD-EPI: 19 ML/MIN/1.73 M 2
GLOBULIN SER CALC-MCNC: 3 G/DL (ref 1.9–3.5)
GLUCOSE BLD STRIP.AUTO-MCNC: 51 MG/DL (ref 65–99)
GLUCOSE BLD STRIP.AUTO-MCNC: 73 MG/DL (ref 65–99)
GLUCOSE SERPL-MCNC: 113 MG/DL (ref 65–99)
GLUCOSE SERPL-MCNC: 60 MG/DL (ref 65–99)
GLUCOSE UR STRIP.AUTO-MCNC: NEGATIVE MG/DL
HCT VFR BLD AUTO: 36.3 % (ref 42–52)
HGB BLD-MCNC: 12 G/DL (ref 14–18)
IMM GRANULOCYTES # BLD AUTO: 0.02 K/UL (ref 0–0.11)
IMM GRANULOCYTES NFR BLD AUTO: 0.3 % (ref 0–0.9)
KETONES UR STRIP.AUTO-MCNC: NEGATIVE MG/DL
LEUKOCYTE ESTERASE UR QL STRIP.AUTO: NEGATIVE
LIPASE SERPL-CCNC: 85 U/L (ref 11–82)
LYMPHOCYTES # BLD AUTO: 1.47 K/UL (ref 1–4.8)
LYMPHOCYTES NFR BLD: 25.7 % (ref 22–41)
MCH RBC QN AUTO: 30.9 PG (ref 27–33)
MCHC RBC AUTO-ENTMCNC: 33.1 G/DL (ref 32.3–36.5)
MCV RBC AUTO: 93.6 FL (ref 81.4–97.8)
MICRO URNS: NORMAL
MONOCYTES # BLD AUTO: 0.5 K/UL (ref 0–0.85)
MONOCYTES NFR BLD AUTO: 8.7 % (ref 0–13.4)
NEUTROPHILS # BLD AUTO: 3.54 K/UL (ref 1.82–7.42)
NEUTROPHILS NFR BLD: 62 % (ref 44–72)
NITRITE UR QL STRIP.AUTO: NEGATIVE
NRBC # BLD AUTO: 0 K/UL
NRBC BLD-RTO: 0 /100 WBC (ref 0–0.2)
PH UR STRIP.AUTO: 6.5 [PH] (ref 5–8)
PLATELET # BLD AUTO: 277 K/UL (ref 164–446)
PMV BLD AUTO: 9.7 FL (ref 9–12.9)
POTASSIUM SERPL-SCNC: 4 MMOL/L (ref 3.6–5.5)
POTASSIUM SERPL-SCNC: 4.1 MMOL/L (ref 3.6–5.5)
PROT SERPL-MCNC: 7.2 G/DL (ref 6–8.2)
PROT UR QL STRIP: NEGATIVE MG/DL
RBC # BLD AUTO: 3.88 M/UL (ref 4.7–6.1)
RBC UR QL AUTO: NEGATIVE
SODIUM SERPL-SCNC: 136 MMOL/L (ref 135–145)
SODIUM SERPL-SCNC: 136 MMOL/L (ref 135–145)
SP GR UR STRIP.AUTO: 1.01
UROBILINOGEN UR STRIP.AUTO-MCNC: 0.2 MG/DL
WBC # BLD AUTO: 5.7 K/UL (ref 4.8–10.8)

## 2023-10-06 PROCEDURE — 99285 EMERGENCY DEPT VISIT HI MDM: CPT

## 2023-10-06 PROCEDURE — 85025 COMPLETE CBC W/AUTO DIFF WBC: CPT

## 2023-10-06 PROCEDURE — 96374 THER/PROPH/DIAG INJ IV PUSH: CPT

## 2023-10-06 PROCEDURE — 700111 HCHG RX REV CODE 636 W/ 250 OVERRIDE (IP): Mod: JZ | Performed by: STUDENT IN AN ORGANIZED HEALTH CARE EDUCATION/TRAINING PROGRAM

## 2023-10-06 PROCEDURE — 80053 COMPREHEN METABOLIC PANEL: CPT

## 2023-10-06 PROCEDURE — 81003 URINALYSIS AUTO W/O SCOPE: CPT

## 2023-10-06 PROCEDURE — 82962 GLUCOSE BLOOD TEST: CPT

## 2023-10-06 PROCEDURE — 36415 COLL VENOUS BLD VENIPUNCTURE: CPT

## 2023-10-06 PROCEDURE — 700105 HCHG RX REV CODE 258: Performed by: STUDENT IN AN ORGANIZED HEALTH CARE EDUCATION/TRAINING PROGRAM

## 2023-10-06 PROCEDURE — 93005 ELECTROCARDIOGRAM TRACING: CPT | Performed by: STUDENT IN AN ORGANIZED HEALTH CARE EDUCATION/TRAINING PROGRAM

## 2023-10-06 PROCEDURE — 74176 CT ABD & PELVIS W/O CONTRAST: CPT

## 2023-10-06 PROCEDURE — 83690 ASSAY OF LIPASE: CPT

## 2023-10-06 PROCEDURE — 80048 BASIC METABOLIC PNL TOTAL CA: CPT

## 2023-10-06 RX ORDER — ONDANSETRON 4 MG/1
4 TABLET, ORALLY DISINTEGRATING ORAL ONCE
Status: COMPLETED | OUTPATIENT
Start: 2023-10-06 | End: 2023-10-06

## 2023-10-06 RX ORDER — SODIUM CHLORIDE, SODIUM LACTATE, POTASSIUM CHLORIDE, AND CALCIUM CHLORIDE .6; .31; .03; .02 G/100ML; G/100ML; G/100ML; G/100ML
250 INJECTION, SOLUTION INTRAVENOUS ONCE
Status: COMPLETED | OUTPATIENT
Start: 2023-10-06 | End: 2023-10-06

## 2023-10-06 RX ORDER — ONDANSETRON 2 MG/ML
4 INJECTION INTRAMUSCULAR; INTRAVENOUS ONCE
Status: COMPLETED | OUTPATIENT
Start: 2023-10-06 | End: 2023-10-06

## 2023-10-06 RX ORDER — ONDANSETRON 4 MG/1
4 TABLET, ORALLY DISINTEGRATING ORAL EVERY 6 HOURS PRN
Qty: 10 TABLET | Refills: 0 | Status: SHIPPED | OUTPATIENT
Start: 2023-10-06

## 2023-10-06 RX ADMIN — ONDANSETRON 4 MG: 4 TABLET, ORALLY DISINTEGRATING ORAL at 19:28

## 2023-10-06 RX ADMIN — ONDANSETRON 4 MG: 2 INJECTION INTRAMUSCULAR; INTRAVENOUS at 22:26

## 2023-10-06 RX ADMIN — SODIUM CHLORIDE, POTASSIUM CHLORIDE, SODIUM LACTATE AND CALCIUM CHLORIDE 250 ML: 600; 310; 30; 20 INJECTION, SOLUTION INTRAVENOUS at 20:29

## 2023-10-06 ASSESSMENT — FIBROSIS 4 INDEX: FIB4 SCORE: 0.69

## 2023-10-07 NOTE — ED NOTES
"Pt discharged to home with steady gait.  Pt alert and oriented times 4 on room air.  IV discontinued and gauze placed, pt in possession of belongings.  Pt provided discharge education and information pertaining to medications and follow up appointments.  Pt received copy of discharge instructions and verbalized understanding. Encouraged to follow up with PCP. BP 92/53   Pulse 60   Temp 36.2 °C (97.1 °F) (Temporal)   Resp 16   Ht 1.676 m (5' 6\")   Wt 109 kg (240 lb 4.8 oz)   SpO2 94%   BMI 38.79 kg/m²      "

## 2023-10-07 NOTE — ED NOTES
Pt resting in gurney, pt reports still feeling nauseous, pt provided emesis bag.  ERP provided pt with water for PO challenge.

## 2023-10-07 NOTE — DISCHARGE INSTRUCTIONS
You were seen in the emergency room for evaluation of blood in your urine.  The CT scan of your abdomen shows no evidence of kidney stones.  Your kidney function does improve slightly worse from labs done 10 months ago but we gave you some IV fluids and rechecked her kidney function and it has improved.  Please stay well-hydrated.  It is unclear what is causing your nausea and vomiting.  Please take Zofran for treatment of nausea so you can drink fluids.  Return to the emergency room if you have any fever, abdominal pain, no urine output, persistent vomiting despite using Zofran or any other concerns.

## 2023-10-07 NOTE — ED NOTES
Bedside report given to Lance BROWN. Pt on monitor, call light within reach.   POC discussed, will medicate per MAR.

## 2023-10-07 NOTE — ED PROVIDER NOTES
ED Provider Note    CHIEF COMPLAINT  Chief Complaint   Patient presents with    Blood in Urine    N/V       EXTERNAL RECORDS REVIEWED  Outpatient Notes patient was seen at urgent care on August 10 for herpes zoster    HPI/ROS  LIMITATION TO HISTORY   Select: : None  OUTSIDE HISTORIAN(S):  None    Jaden Becerra is a 62 y.o. male who presents for evaluation of hematuria.  He states that yesterday he had an episode of red blood in his urine.  He had another episode at 11 AM this morning but has urinated twice following the episode with no blood in his urine.  He has a history of chronic kidney disease but is not on dialysis and does not know who his nephrologist is.  He presents to the emergency room as his nephrologist told him to come to the emergency room if he ever urinates blood.  He has no history of kidney stones.  He tells me that he has had bilateral flank pain for the past week which has been constant.  He started having nausea and vomiting starting on Wednesday.  He last vomited earlier this morning.  He has vomited approximately 6 times in the past 2 days.  He has no abdominal pain, chest pain, shortness of breath, fever, chills, dysuria.  He is on Eliquis.    PAST MEDICAL HISTORY   He has a history of heart failure, hyperlipidemia, atrial fibrillation, diabetes, CKD    SURGICAL HISTORY  patient denies any surgical history    FAMILY HISTORY  No family history on file.    SOCIAL HISTORY  Social History     Tobacco Use    Smoking status: Never    Smokeless tobacco: Current     Types: Chew   Vaping Use    Vaping Use: Never used   Substance and Sexual Activity    Alcohol use: Not Currently    Drug use: Never    Sexual activity: Not on file     CURRENT MEDICATIONS  Home Medications    **Home medications have not yet been reviewed for this encounter**       ALLERGIES  Allergies   Allergen Reactions    Metoprolol Anaphylaxis    Cortisone      2000-10-07;hickups w/ injection     PHYSICAL EXAM  VITAL SIGNS: BP (!)  "146/79   Pulse 91   Temp 37.1 °C (98.7 °F) (Temporal)   Resp 18   Ht 1.676 m (5' 6\")   Wt 109 kg (240 lb 4.8 oz)   SpO2 96%   BMI 38.79 kg/m²      Constitutional: Well developed, Well nourished, No acute distress, Non-toxic appearance.   HEENT: Normocephalic, Atraumatic,  external ears normal, pharynx pink,  Mucous  Membranes moist, No rhinorrhea or mucosal edema  Eyes: PERRL, EOMI, Conjunctiva normal, No discharge.   Neck: Normal range of motion, No tenderness, Supple, No stridor.   Lymphatic: No lymphadenopathy    Cardiovascular: Regular Rate and Rhythm, No murmurs,  rubs, or gallops.   Thorax & Lungs: Lungs clear to auscultation bilaterally, No respiratory distress, No wheezes, rhales or rhonchi, No chest wall tenderness.   Abdomen: Soft, non tender, non distended,  No pulsatile masses., no rebound guarding or peritoneal signs.   Skin: Warm, Dry, No erythema, No rash,   Back:  Bilateral CVA tenderness,  No spinal tenderness, bony crepitance step offs or instability.   Extremities: Equal, intact distal pulses, No cyanosis, clubbing or edema,  No tenderness.   Musculoskeletal: Good range of motion in all major joints. No tenderness to palpation or major deformities noted.   Neurologic: Alert & oriented No focal deficits noted.  Psychiatric: Affect normal, Judgment normal, Mood normal.      DIAGNOSTIC STUDIES / PROCEDURES      LABS/EKG  Results for orders placed or performed during the hospital encounter of 10/06/23   CBC WITH DIFFERENTIAL   Result Value Ref Range    WBC 5.7 4.8 - 10.8 K/uL    RBC 3.88 (L) 4.70 - 6.10 M/uL    Hemoglobin 12.0 (L) 14.0 - 18.0 g/dL    Hematocrit 36.3 (L) 42.0 - 52.0 %    MCV 93.6 81.4 - 97.8 fL    MCH 30.9 27.0 - 33.0 pg    MCHC 33.1 32.3 - 36.5 g/dL    RDW 51.8 (H) 35.9 - 50.0 fL    Platelet Count 277 164 - 446 K/uL    MPV 9.7 9.0 - 12.9 fL    Neutrophils-Polys 62.00 44.00 - 72.00 %    Lymphocytes 25.70 22.00 - 41.00 %    Monocytes 8.70 0.00 - 13.40 %    Eosinophils 2.60 0.00 - " 6.90 %    Basophils 0.70 0.00 - 1.80 %    Immature Granulocytes 0.30 0.00 - 0.90 %    Nucleated RBC 0.00 0.00 - 0.20 /100 WBC    Neutrophils (Absolute) 3.54 1.82 - 7.42 K/uL    Lymphs (Absolute) 1.47 1.00 - 4.80 K/uL    Monos (Absolute) 0.50 0.00 - 0.85 K/uL    Eos (Absolute) 0.15 0.00 - 0.51 K/uL    Baso (Absolute) 0.04 0.00 - 0.12 K/uL    Immature Granulocytes (abs) 0.02 0.00 - 0.11 K/uL    NRBC (Absolute) 0.00 K/uL   COMP METABOLIC PANEL   Result Value Ref Range    Sodium 136 135 - 145 mmol/L    Potassium 4.1 3.6 - 5.5 mmol/L    Chloride 101 96 - 112 mmol/L    Co2 23 20 - 33 mmol/L    Anion Gap 12.0 7.0 - 16.0    Glucose 113 (H) 65 - 99 mg/dL    Bun 44 (H) 8 - 22 mg/dL    Creatinine 3.68 (H) 0.50 - 1.40 mg/dL    Calcium 9.7 8.5 - 10.5 mg/dL    Correct Calcium 9.5 8.5 - 10.5 mg/dL    AST(SGOT) 15 12 - 45 U/L    ALT(SGPT) 14 2 - 50 U/L    Alkaline Phosphatase 54 30 - 99 U/L    Total Bilirubin 0.4 0.1 - 1.5 mg/dL    Albumin 4.2 3.2 - 4.9 g/dL    Total Protein 7.2 6.0 - 8.2 g/dL    Globulin 3.0 1.9 - 3.5 g/dL    A-G Ratio 1.4 g/dL   LIPASE   Result Value Ref Range    Lipase 85 (H) 11 - 82 U/L   URINALYSIS    Specimen: Urine   Result Value Ref Range    Color Yellow     Character Clear     Specific Gravity 1.011 <1.035    Ph 6.5 5.0 - 8.0    Glucose Negative Negative mg/dL    Ketones Negative Negative mg/dL    Protein Negative Negative mg/dL    Bilirubin Negative Negative    Urobilinogen, Urine 0.2 Negative    Nitrite Negative Negative    Leukocyte Esterase Negative Negative    Occult Blood Negative Negative    Micro Urine Req see below    ESTIMATED GFR   Result Value Ref Range    GFR (CKD-EPI) 18 (A) >60 mL/min/1.73 m 2   BASIC METABOLIC PANEL   Result Value Ref Range    Sodium 136 135 - 145 mmol/L    Potassium 4.0 3.6 - 5.5 mmol/L    Chloride 103 96 - 112 mmol/L    Co2 23 20 - 33 mmol/L    Glucose 60 (L) 65 - 99 mg/dL    Bun 44 (H) 8 - 22 mg/dL    Creatinine 3.43 (H) 0.50 - 1.40 mg/dL    Calcium 9.2 8.5 - 10.5 mg/dL     Anion Gap 10.0 7.0 - 16.0   ESTIMATED GFR   Result Value Ref Range    GFR (CKD-EPI) 19 (A) >60 mL/min/1.73 m 2   EKG (Now)   Result Value Ref Range    Report       [facility] Emergency Dept.    Test Date:  2023-10-06  Pt Name:    MELISSA FARIAS                 Department: ER  MRN:        [id number]                      Room:       [facility]  Gender:     Male                         Technician: [id number]  :        1960                   Requested By:STEPHEN VILLARREAL  Order #:    [id number]                    Reading MD: Stephen Villarreal    Measurements  Intervals                                Axis  Rate:       67                           P:          35  IA:         167                          QRS:        16  QRSD:       87                           T:          21  QT:         364  QTc:        385    Interpretive Statements  Sinus rhythm  Normal axis  Normal intervals  T wave inversion in III, T wave flattening in V1  Otherwise no ST or T wave changes  No previous ECG available for comparison  Electronically Signed On 10- 21:48:19 PDT by Stephen Villarreal     POCT glucose device results   Result Value Ref Range    POC Glucose, Blood 51 (L) 65 - 99 mg/dL   POCT glucose device results   Result Value Ref Range    POC Glucose, Blood 73 65 - 99 mg/dL       I have independently interpreted this EKG    RADIOLOGY  I have independently interpreted the diagnostic imaging associated with this visit and am waiting the final reading from the radiologist.   My preliminary interpretation is as follows: no free air  Radiologist interpretation:   CT-ABDOMEN-PELVIS W/O   Final Result      No acute abnormality within the abdomen or pelvis            COURSE & MEDICAL DECISION MAKING    ED Observation Status? Yes; I am placing the patient in to an observation status due to a diagnostic uncertainty as well as therapeutic intensity. Patient placed in observation status at 6:59 AM, 10/6/2023.     Observation plan  is as follows: labs, imaging, consideration of admission    7:53 PM patient was reevaluated at bedside.  Discussed lab results which showed no leukocytosis, creatinine of 3.68 which is slightly improved from 3.2 ten months ago.  CT abdomen shows no etiology of his hematuria.  UA shows no signs of infection.  We will p.o. trial the patient.    8:30 PM we will give small IV fluid bolus and then recheck creatinine.    11:40 PM patient was reevaluated at bedside.  He states that he is feeling better.  He ate saltines and drink some juice.  He has had no vomiting in the emergency room.  He states that he feels comfortable going home.  I ensured the patient that he needs to make sure that he stays well-hydrated.  Discussed results with the patient and his wife.  He has follow-up with nephrology in approximately 1 week therefore will discuss further.  Strict ER return precautions were discussed including persistent vomiting, fever, abdominal pain or any other concern.    Upon Reevaluation, the patient's condition has: Improved; and will be discharged.    Patient discharged from ED Observation status at 11:40 PM (Time) 10/06/2023 (Date).     INITIAL ASSESSMENT, COURSE AND PLAN  Care Narrative: This is a 62-year-old male who presents for evaluation of 2 episodes of hematuria over the past 2 days.  On arrival his vital signs are stable.  He has nontoxic appearance.  He is not urinating blood at this time.    Labs are obtained and there is no leukocytosis.  His hemoglobin is at baseline.  His platelets are normal.  Electrolytes are within normal limits.  His creatinine is elevated at 3.68 and in comparison to 10 months ago his creatinine was 3.2.  UA shows no evidence of blood in his urine.  There is no evidence of infection.  CT abdomen was obtained without contrast and shows no evidence of kidney stone or other process to explain his hematuria.    I do suspect that his slight elevation in creatinine is likely related to his  nausea and vomiting that has been ongoing for the past couple days.  He last vomited this morning.  He has not had a fever.  An EKG was checked and shows no evidence of ischemia.  He has no abdominal pain therefore I doubt acute intra-abdominal abnormality.  His LFTs are reassuring.  Lipase is not significantly elevated.  He was given a small IV fluid bolus due to his history of heart failure and a BMP was rechecked with improvement of his creatinine to 3.43.  Because of this I suspect that elevation his creatinine is likely prerenal.  He has follow-up with his nephrologist in approximately a week.  He was given Zofran and is able to eat.  He reports to be feeling better.  On reevaluation, the patient's wife is not at bedside.  I reviewed lab results with the patient and his wife.  He reports to be feeling better and would like to go home.  I will send a prescription for Zofran.  He is instructed to stay well-hydrated.  Patient is instructed to monitor his symptoms closely and return if he develops abdominal pain, persistent vomiting, fever, chest pain or any other concerns.  Patient is wife are agreeable to discharge plan with no further questions.    HYDRATION: Based on the patient's presentation of Acute Kidney Injury the patient was given IV fluids. IV Hydration was used because oral hydration was not as rapid as required. Upon recheck following hydration, the patient was improved with creatinine improved.        DISPOSITION AND DISCUSSIONS  Patient discharged home in stable condition with instructions to follow-up with his primary care doctor and nephrologist.  Strict ER return precautions were discussed.  Patient is agreeable to discharge plan with no further questions.    FINAL DIAGNOSIS  1. Hematuria, unspecified type    2. Chronic kidney disease, unspecified CKD stage    3. Nausea and vomiting, unspecified vomiting type           Electronically signed by: Tasia Villarreal M.D., 10/6/2023 6:59 PM

## 2023-10-07 NOTE — ED NOTES
Bedside report received from off going RN/tech: KEVIN Cary assumed care of patient.  POC discussed with patient. Call light within reach, all needs addressed at this time.       Fall risk interventions in place: Move the patient closer to the nurse's station, Patient's personal possessions are with in their safe reach, and Keep floor surfaces clean and dry (all applicable per [facility] Fall risk assessment)   Continuous monitoring: Pulse Ox or Blood Pressure  IVF/IV medications: Not Applicable   Oxygen: How many liters 2L  Bedside sitter: Not Applicable   Isolation: Not Applicable

## 2023-10-07 NOTE — ED NOTES
Pt c/o bilateral flank pain and blood in the urine since last night. Pt. Reports hx of kidney disease.   Denies painful urination.

## 2023-10-07 NOTE — ED NOTES
Chief Complaint   Patient presents with    Blood in Urine    N/V     Pt ambulated to triage with above complaints x2days. Denies dysuria.   Urine obtained, no obvious blood noted in urine.

## 2023-11-27 ENCOUNTER — HOSPITAL ENCOUNTER (EMERGENCY)
Facility: MEDICAL CENTER | Age: 63
End: 2023-11-27
Attending: EMERGENCY MEDICINE
Payer: COMMERCIAL

## 2023-11-27 ENCOUNTER — APPOINTMENT (OUTPATIENT)
Dept: RADIOLOGY | Facility: MEDICAL CENTER | Age: 63
End: 2023-11-27
Attending: EMERGENCY MEDICINE
Payer: COMMERCIAL

## 2023-11-27 VITALS
TEMPERATURE: 98 F | WEIGHT: 237.22 LBS | DIASTOLIC BLOOD PRESSURE: 86 MMHG | HEIGHT: 66 IN | SYSTOLIC BLOOD PRESSURE: 178 MMHG | BODY MASS INDEX: 38.12 KG/M2 | HEART RATE: 64 BPM | RESPIRATION RATE: 20 BRPM | OXYGEN SATURATION: 96 %

## 2023-11-27 DIAGNOSIS — H05.89 ORBITAL MASS: ICD-10-CM

## 2023-11-27 LAB
ANION GAP SERPL CALC-SCNC: 11 MMOL/L (ref 7–16)
BASOPHILS # BLD AUTO: 0.6 % (ref 0–1.8)
BASOPHILS # BLD: 0.03 K/UL (ref 0–0.12)
BUN SERPL-MCNC: 19 MG/DL (ref 8–22)
CALCIUM SERPL-MCNC: 10.5 MG/DL (ref 8.5–10.5)
CHLORIDE SERPL-SCNC: 108 MMOL/L (ref 96–112)
CO2 SERPL-SCNC: 21 MMOL/L (ref 20–33)
CREAT SERPL-MCNC: 2.48 MG/DL (ref 0.5–1.4)
EOSINOPHIL # BLD AUTO: 0.11 K/UL (ref 0–0.51)
EOSINOPHIL NFR BLD: 2.1 % (ref 0–6.9)
ERYTHROCYTE [DISTWIDTH] IN BLOOD BY AUTOMATED COUNT: 50.8 FL (ref 35.9–50)
GFR SERPLBLD CREATININE-BSD FMLA CKD-EPI: 28 ML/MIN/1.73 M 2
GLUCOSE SERPL-MCNC: 90 MG/DL (ref 65–99)
HCT VFR BLD AUTO: 39.2 % (ref 42–52)
HGB BLD-MCNC: 12.9 G/DL (ref 14–18)
IMM GRANULOCYTES # BLD AUTO: 0.03 K/UL (ref 0–0.11)
IMM GRANULOCYTES NFR BLD AUTO: 0.6 % (ref 0–0.9)
LYMPHOCYTES # BLD AUTO: 1.32 K/UL (ref 1–4.8)
LYMPHOCYTES NFR BLD: 25.1 % (ref 22–41)
MCH RBC QN AUTO: 32.3 PG (ref 27–33)
MCHC RBC AUTO-ENTMCNC: 32.9 G/DL (ref 32.3–36.5)
MCV RBC AUTO: 98.2 FL (ref 81.4–97.8)
MONOCYTES # BLD AUTO: 0.35 K/UL (ref 0–0.85)
MONOCYTES NFR BLD AUTO: 6.7 % (ref 0–13.4)
NEUTROPHILS # BLD AUTO: 3.41 K/UL (ref 1.82–7.42)
NEUTROPHILS NFR BLD: 64.9 % (ref 44–72)
NRBC # BLD AUTO: 0 K/UL
NRBC BLD-RTO: 0 /100 WBC (ref 0–0.2)
PLATELET # BLD AUTO: 266 K/UL (ref 164–446)
PMV BLD AUTO: 9 FL (ref 9–12.9)
POTASSIUM SERPL-SCNC: 5 MMOL/L (ref 3.6–5.5)
RBC # BLD AUTO: 3.99 M/UL (ref 4.7–6.1)
SODIUM SERPL-SCNC: 140 MMOL/L (ref 135–145)
WBC # BLD AUTO: 5.3 K/UL (ref 4.8–10.8)

## 2023-11-27 PROCEDURE — 700117 HCHG RX CONTRAST REV CODE 255: Performed by: EMERGENCY MEDICINE

## 2023-11-27 PROCEDURE — 85025 COMPLETE CBC W/AUTO DIFF WBC: CPT

## 2023-11-27 PROCEDURE — 80048 BASIC METABOLIC PNL TOTAL CA: CPT

## 2023-11-27 PROCEDURE — 36415 COLL VENOUS BLD VENIPUNCTURE: CPT

## 2023-11-27 PROCEDURE — 70487 CT MAXILLOFACIAL W/DYE: CPT

## 2023-11-27 PROCEDURE — 700102 HCHG RX REV CODE 250 W/ 637 OVERRIDE(OP): Performed by: EMERGENCY MEDICINE

## 2023-11-27 PROCEDURE — A9270 NON-COVERED ITEM OR SERVICE: HCPCS | Performed by: EMERGENCY MEDICINE

## 2023-11-27 PROCEDURE — 700101 HCHG RX REV CODE 250: Performed by: EMERGENCY MEDICINE

## 2023-11-27 PROCEDURE — 99284 EMERGENCY DEPT VISIT MOD MDM: CPT

## 2023-11-27 RX ORDER — PROPARACAINE HYDROCHLORIDE 5 MG/ML
2 SOLUTION/ DROPS OPHTHALMIC ONCE
Status: COMPLETED | OUTPATIENT
Start: 2023-11-27 | End: 2023-11-27

## 2023-11-27 RX ORDER — OXYCODONE HYDROCHLORIDE AND ACETAMINOPHEN 5; 325 MG/1; MG/1
1 TABLET ORAL ONCE
Status: COMPLETED | OUTPATIENT
Start: 2023-11-27 | End: 2023-11-27

## 2023-11-27 RX ADMIN — IOHEXOL 80 ML: 350 INJECTION, SOLUTION INTRAVENOUS at 15:45

## 2023-11-27 RX ADMIN — OXYCODONE AND ACETAMINOPHEN 1 TABLET: 5; 325 TABLET ORAL at 16:06

## 2023-11-27 RX ADMIN — FLUORESCEIN SODIUM 1 MG: 1 STRIP OPHTHALMIC at 18:01

## 2023-11-27 RX ADMIN — PROPARACAINE HYDROCHLORIDE 2 DROP: 5 SOLUTION/ DROPS OPHTHALMIC at 18:01

## 2023-11-27 ASSESSMENT — PAIN DESCRIPTION - PAIN TYPE
TYPE: ACUTE PAIN
TYPE: ACUTE PAIN

## 2023-11-27 ASSESSMENT — FIBROSIS 4 INDEX: FIB4 SCORE: 0.9

## 2023-11-27 NOTE — ED TRIAGE NOTES
Chief Complaint   Patient presents with    Eye Pain     Patient reports left eye pain and seeing round circles with spots of color x one week.     Headache     Since this morning.

## 2023-11-27 NOTE — ED NOTES
Patient ambulated to [facility] without incident. Primary assessment complete. Patient oriented to care area. Chart up for ERP.

## 2023-11-27 NOTE — Clinical Note
Jaden Becerra was seen and treated in our emergency department on 11/27/2023.  He may return to work on 11/27/2023.  Mr. Becerra will need to be excused from work for the next available ophthalmology appointment.      If you have any questions or concerns, please don't hesitate to call.      James Reid M.D.

## 2023-11-27 NOTE — ED PROVIDER NOTES
ED Provider Note    CHIEF COMPLAINT  Chief Complaint   Patient presents with    Eye Pain     Patient reports left eye pain and seeing round circles with spots of color x one week.     Headache     Since this morning.        EXTERNAL RECORDS REVIEWED  Outside records from most recent visit was to urology [place] for an unrelated condition.  No sign of history of significant ophthalmologic symptoms.    HPI/ROS  LIMITATION TO HISTORY       OUTSIDE HISTORIAN(S):      Jaden Becerra is a 62 y.o. male who presents to the emergency department referred by ophthalmology, he says, he was not able to see him until February.  He says that for about the last week, he has had some pain in and below his left eye, occasionally with binocular diplopia, seeing some halos of color from time to time.  This morning, he developed a headache.  He feels a firm mass in the soft tissues of the lower eyelid, just above the zygomatic arch.  There is been no falls or trauma, confusion, weakness or dizziness, fevers or chills or drainage.  He tried to call optometry and ophthalmology, and was ultimately referred here.  He is a diabetic.  He does not wear contact lenses.  There is been no changes to his medications recently, or prescriptions.    PAST MEDICAL HISTORY   has a past medical history of Diabetes (HCC), Hypertension, and Renal disorder.    SURGICAL HISTORY  patient denies any surgical history    FAMILY HISTORY  History reviewed. No pertinent family history.    SOCIAL HISTORY  Social History     Tobacco Use    Smoking status: Never    Smokeless tobacco: Current     Types: Chew   Vaping Use    Vaping Use: Never used   Substance and Sexual Activity    Alcohol use: Not Currently    Drug use: Never    Sexual activity: Not on file       CURRENT MEDICATIONS  Home Medications       Reviewed by Abigail Orellana R.N. (Registered Nurse) on 11/27/23 at 1122  Med List Status: Partial     Medication Last Dose Status   amiodarone (CORDARONE) 200 MG  Tab  Active   amLODIPine (NORVASC) 10 MG Tab  Active   amLODIPine (NORVASC) 10 MG Tab  Active   apixaban (ELIQUIS) 5mg Tab  Active   aripiprazole (ABILIFY) 20 MG tablet  Active   ARIPiprazole (ABILIFY) 5 MG tablet  Active   aspirin 81 MG EC tablet  Active   aspirin EC (ECOTRIN) 325 MG Tablet Delayed Response  Active   atorvastatin (LIPITOR) 20 MG Tab  Active   atorvastatin (LIPITOR) 80 MG tablet  Active   atorvastatin (LIPITOR) 80 MG tablet  Active   bumetanide (BUMEX) 2 MG tablet  Active   carvedilol (COREG) 6.25 MG Tab  Active   carvedilol (COREG) 6.25 MG Tab  Active   Cholecalciferol (D3 PO)  Active   Cholecalciferol 2000 UNIT Tab  Active   clonazePAM (KLONOPIN) 0.5 MG Tab  Active   cloNIDine (CATAPRES) 0.3 MG Tab  Active   Cyanocobalamin (B-12 PO)  Active   cyanocobalamin (VITAMIN B-12) 100 MCG Tab  Active   cyanocobalamin 100 MCG tablet  Active   DILTIAZem CD (CARDIZEM CD) 180 MG CAPSULE SR 24 HR  Active   ELIQUIS 2.5 MG Tab  Active   ezetimibe (ZETIA) 10 MG Tab  Active   fenofibrate (TRIGLIDE) 160 MG tablet  Active   gabapentin (NEURONTIN) 300 MG Cap  Active   gabapentin (NEURONTIN) 300 MG Cap  Active   glyBURIDE (DIABETA) 2.5 MG Tab  Active   glyBURIDE (DIABETA) 2.5 MG Tab  Active   isosorbide mononitrate SR (IMDUR) 30 MG TABLET SR 24 HR  Active   lamoTRIgine (LAMICTAL) 100 MG Tab  Active   lamotrigine (LAMICTAL) 200 MG tablet  Active   lamoTRIgine (LAMICTAL) 25 MG Tab  Active   LAMOTRIGINE PO  Active   Lidocaine 5 % Cream  Active   mirtazapine (REMERON) 15 MG Tab  Active   mirtazapine (REMERON) 15 MG Tab  Active   nitroglycerin (NITROSTAT) 0.4 MG SL Tab  Active   omeprazole (PRILOSEC) 20 MG delayed-release capsule  Active   omeprazole (PRILOSEC) 40 MG delayed-release capsule  Active   ondansetron (ZOFRAN ODT) 4 MG TABLET DISPERSIBLE  Active   oxyCODONE-acetaminophen (PERCOCET) 5-325 MG Tab  Active   sertraline (ZOLOFT) 100 MG Tab  Active   sertraline (ZOLOFT) 100 MG Tab  Active   spironolactone (ALDACTONE)  "25 MG Tab  Active   temazepam (RESTORIL) 15 MG Cap  Active   valsartan (DIOVAN) 320 MG tablet  Active   vitamin D2, Ergocalciferol, (DRISDOL) 1.25 MG (48653 UT) Cap capsule  Active                    ALLERGIES  Allergies   Allergen Reactions    Metoprolol Anaphylaxis    Cortisone      2000-10-07;hickups w/ injection       PHYSICAL EXAM  VITAL SIGNS: BP (!) 178/86   Pulse 64   Temp 36.7 °C (98 °F) (Temporal)   Resp 20   Ht 1.676 m (5' 6\")   Wt 108 kg (237 lb 3.4 oz)   SpO2 96%   BMI 38.29 kg/m²   Pulse ox interpretation: I interpret this pulse ox as normal.  Constitutional: Alert in no apparent distress.  HENT: No signs of trauma, Bilateral external ears normal, Nose normal.  PERRLA, EOMI.  There is a firm, minimally mobile, nonerythematous mass in the soft tissues of the left lower eyelid above the zygomatic arch, possibly stemming from the lacrimal sac, but extending more horizontally than vertically.  No overlying cellulitis  Eyes: Pupils are equal and reactive, Conjunctiva normal, Non-icteric.   OS (Left Eye): 20/70 OD (Right Eye): 20/40 OU (Both Eyes): 20/50   Neck: Normal range of motion, Supple, No stridor.    Cardiovascular: Normal peripheral perfusion  Thorax & Lungs: Unlabored respirations, equal chest expansion, no accessory muscle use  Abdomen: Non-distended  Skin:  No erythema, No rash.   Back: Normal alignment and ROM  Extremities: No gross deformity  Musculoskeletal: Good range of motion in all major joints.   Neurologic: Alert, Normal motor function, No focal deficits noted.   Psychiatric: Affect normal, Judgment normal, Mood normal.      DIAGNOSTIC STUDIES / PROCEDURES    LABS  Labs Reviewed   CBC WITH DIFFERENTIAL - Abnormal; Notable for the following components:       Result Value    RBC 3.99 (*)     Hemoglobin 12.9 (*)     Hematocrit 39.2 (*)     MCV 98.2 (*)     RDW 50.8 (*)     All other components within normal limits   BASIC METABOLIC PANEL - Abnormal; Notable for the following " components:    Creatinine 2.48 (*)     All other components within normal limits   ESTIMATED GFR - Abnormal; Notable for the following components:    GFR (CKD-EPI) 28 (*)     All other components within normal limits         RADIOLOGY  I have independently interpreted the diagnostic imaging associated with this visit and am waiting the final reading from the radiologist.   My preliminary interpretation is as follows: Soft tissue mass under the left thigh.    Radiologist interpretation:   CT-MAXILLOFACIAL WITH PLUS RECONS   Final Result      1.  There is a heterogeneously enhancing infiltrative extraconal masslike soft tissue in the inferomedial left orbit as described above. This is most suspicious for a malignancy such as lymphoma, atypical metastasis or other nonspecific mesenchymal tumor    which are favored over hemangioma.            COURSE & MEDICAL DECISION MAKING    ED Observation Status? Yes; I am placing the patient in to an observation status due to a diagnostic uncertainty as well as therapeutic intensity. Patient placed in observation status at 1:27 PM, 11/27/2023.     Observation plan is as follows: Screening labs, imaging, consideration for ophthalmology consult depending on results.    Upon Reevaluation, the patient's condition is stable, and prompt outpatient follow-up has been confirmed    Patient discharged from ED Observation status at 11/27/23 (Time) 5:56 PM (Date).     INITIAL ASSESSMENT, COURSE AND PLAN  Care Narrative:     1:27 PM patient evaluated at the bedside.  He has a palpable mass in the soft tissues of the left lower eyelid, above the zygomatic arch.  This is the most likely cause of his symptoms.  It is asymmetric, not present on the right side.  There is no overlying erythema, but differential includes but is not limited to dacryocystitis, abscess, mass.  I think the pressure effect is what is causing the patient's symptoms, as his ocular exam is otherwise unremarkable within the  limits of emergency department capabilities.  He is being evaluated with screening laboratory test prior to contrast enhanced CT of the maxillofacial bones and soft tissues.    3:29 PM this patient is back from CT.  Renal function was abnormal, but consistent with the patient's baseline.    4:59 PM imaging results were discussed with Dr. Birmingham, from ophthalmology, who will see the patient for preliminary exam within the next hour, and may reach out to oculoplastics to consider excision or biopsy.  I explained this to the patient.  He clarifies that he does have a remote history of prostate cancer.  He had what sounds like a radical prostatectomy about 13 years ago, but was treated within the [facility] system in [place], so not within our records.    5:56 PM I discussed the patient with Dr. Quinones, after she saw the patient at the bedside.  She herself spoke with Dr. Lai, oculoplastics, and he will see the patient soon as possible in the clinic to consider excisional biopsy.  The patient also has a primary care doctor with whom he can follow-up.  I placed referrals for oculoplastics, and for oncology, and given the patient the phone number for our schedulers.  There is no indication for hospitalization, so these confirmed referrals and confirm necessity with oculoplastics can be pursued in the outpatient setting.        ADDITIONAL PROBLEM LIST  Remote history of prostate cancer, raises concern for recurrence in the setting    DISPOSITION AND DISCUSSIONS  I have discussed management of the patient with the following physicians and IWLY's: Dr. Quinones, ophthalmology, as above.  We have established a close follow-up plan with oculoplastics for this patient.  Escalation of care considered, and ultimately not performed:acute inpatient care management, however at this time, the patient is most appropriate for outpatient management.  Prompt and appropriate outpatient follow-up specialty care has been established.    Decision  tools and prescription drugs considered including, but not limited to: Medication modification considered, but no current recommendations from ophthalmology, or obvious useful supportive meds .    FINAL DIAGNOSIS  1. Orbital mass           Electronically signed by: James Reid M.D., 11/27/2023 1:07 PM

## 2023-11-28 NOTE — DISCHARGE INSTRUCTIONS
We have placed referrals for you to see oncology and oculoplastics.  Please call Dr. Oliveira, oculoplastics, tomorrow.  Please call your primary care provider tomorrow to discuss this visit, and to request any assistance you might need scheduling these referrals.  Our schedulers will contact you to help arrange appointments, but you can also use the phone number listed here to call [facility]'s scheduling office.

## 2023-11-28 NOTE — CONSULTS
OPHTHALMOLOGY CONSULT    CC: left eye pain, diplopia     HPI: Jaden Becerra is a 62 y.o. male with remote history of prostate cancer s/p radical prostatectomy here with 1 week of left eye pain with headache and diplopia. Denies any ocular history prior apart from refractive error. Notes double vision is binocular horizontal and worse when looking to the right. He also has been noticing a mass by the lower eyelid.     Ocular History: refractive error   Ocular Meds: none    ROS:   Constitutional: negative  Skin: negative  HEENT: negative, see HPI for ocular  Respiratory: negative  Cardiovascular: negative  Gastrointestinal: negative  Endocrine: negative  Musculoskeletal: negative  Neurologic: negative    Past Medical History:   Past Medical History:   Diagnosis Date    Diabetes (HCC)     Hypertension     Renal disorder        Past Surgical History:   History reviewed. No pertinent surgical history.    Current Hospital Medications:   No current facility-administered medications for this encounter.    Current Outpatient Medications:     ondansetron (ZOFRAN ODT) 4 MG TABLET DISPERSIBLE, Take 1 Tablet by mouth every 6 hours as needed for Nausea/Vomiting. (Patient taking differently: Take 4 mg by mouth every 6 hours as needed for Nausea/Vomiting. taking  Indications: Nausea and Vomiting caused by Cancer Chemotherapy), Disp: 10 Tablet, Rfl: 0    Lidocaine 5 % Cream, Apply 1 Application topically every 8 hours as needed (Skin pain). Use thin layer to intact skin., Disp: 30 g, Rfl: 0    LAMOTRIGINE PO, Take 50 mg by mouth. (Patient not taking: Reported on 4/30/2023), Disp: , Rfl:     amiodarone (CORDARONE) 200 MG Tab, Take 200 mg by mouth every day., Disp: , Rfl:     amLODIPine (NORVASC) 10 MG Tab, Take 1 Tablet by mouth every day. (Patient not taking: Reported on 4/30/2023), Disp: , Rfl:     ARIPiprazole (ABILIFY) 5 MG tablet, Take 20 mg by mouth every day. (Patient not taking: Reported on 4/30/2023), Disp: , Rfl:      aripiprazole (ABILIFY) 20 MG tablet, TAKE 1 TABLET BY MOUTH AT BEDTIME TAKE IN ADDITION TO 5MG PILL (25MG TOTAL/DAY), Disp: , Rfl:     aspirin 81 MG EC tablet, Take 81 mg by mouth every day., Disp: , Rfl:     atorvastatin (LIPITOR) 80 MG tablet, Take 1 Tablet by mouth every day., Disp: , Rfl:     atorvastatin (LIPITOR) 80 MG tablet, Take 80 mg by mouth every day. (Patient not taking: Reported on 4/30/2023), Disp: , Rfl:     bumetanide (BUMEX) 2 MG tablet, TAKE TWO 4MG (8MG) TABLETS DAILY FOR 5 DAYS A WEEK, AND FIVE 2MG (10MG) TABLETS 2 DAYS A WEEK, Disp: , Rfl:     carvedilol (COREG) 6.25 MG Tab, , Disp: , Rfl:     carvedilol (COREG) 6.25 MG Tab, TAKE 1 TABLET (6.25 MG TOTAL) BY MOUTH TWICE A DAY, Disp: , Rfl:     Cholecalciferol 2000 UNIT Tab, Take  by mouth. (Patient not taking: Reported on 4/30/2023), Disp: , Rfl:     clonazePAM (KLONOPIN) 0.5 MG Tab, Take 0.5 mg by mouth., Disp: , Rfl:     cyanocobalamin 100 MCG tablet, Take 1,000 mcg by mouth. (Patient not taking: Reported on 4/30/2023), Disp: , Rfl:     ezetimibe (ZETIA) 10 MG Tab, TAKE 1 TABLET (10 MG TOTAL) BY MOUTH DAILY  DAYS (Patient not taking: Reported on 4/30/2023), Disp: , Rfl:     gabapentin (NEURONTIN) 300 MG Cap, Take 300 mg by mouth. (Patient not taking: Reported on 4/30/2023), Disp: , Rfl:     isosorbide mononitrate SR (IMDUR) 30 MG TABLET SR 24 HR, Take 30 mg by mouth every day., Disp: , Rfl:     lamotrigine (LAMICTAL) 200 MG tablet, Take 200 mg by mouth., Disp: , Rfl:     lamoTRIgine (LAMICTAL) 25 MG Tab, PLEASE SEE ATTACHED FOR DETAILED DIRECTIONS (Patient not taking: Reported on 4/30/2023), Disp: , Rfl:     mirtazapine (REMERON) 15 MG Tab, Take 15 mg by mouth. (Patient not taking: Reported on 4/30/2023), Disp: , Rfl:     mirtazapine (REMERON) 15 MG Tab, TAKE 1/2 TO 1 TABLET BY MOUTH AT BEDTIME AS NEEDED FOR INSOMNIA, Disp: , Rfl:     nitroglycerin (NITROSTAT) 0.4 MG SL Tab, Place 0.4 mg under the tongue. (Patient not taking: Reported  on 4/30/2023), Disp: , Rfl:     omeprazole (PRILOSEC) 40 MG delayed-release capsule, Take 40 mg by mouth every day., Disp: , Rfl:     sertraline (ZOLOFT) 100 MG Tab, Take 200 mg by mouth every day. (Patient not taking: Reported on 4/30/2023), Disp: , Rfl:     spironolactone (ALDACTONE) 25 MG Tab, Take 25 mg by mouth every day., Disp: , Rfl:     temazepam (RESTORIL) 15 MG Cap, TAKE 1 CAPSULE AT BEDTIME AS NEEDED FOR INSOMNIA ORALLY ONCE A DAY 90 DAYS (Patient not taking: Reported on 4/30/2023), Disp: , Rfl:     valsartan (DIOVAN) 320 MG tablet, Take 320 mg by mouth every day., Disp: , Rfl:     ELIQUIS 2.5 MG Tab, 0.5 mg., Disp: , Rfl:     glyBURIDE (DIABETA) 2.5 MG Tab, Take 2.5 mg by mouth. (Patient not taking: Reported on 4/30/2023), Disp: , Rfl:     Cyanocobalamin (B-12 PO), Take  by mouth., Disp: , Rfl:     Cholecalciferol (D3 PO), Take 50 mcg by mouth., Disp: , Rfl:     aspirin EC (ECOTRIN) 325 MG Tablet Delayed Response, Take 81 mg by mouth. (Patient not taking: Reported on 4/30/2023), Disp: , Rfl:     apixaban (ELIQUIS) 5mg Tab, Take 5 mg by mouth 2 times a day. (Patient not taking: Reported on 4/30/2023), Disp: , Rfl:     amLODIPine (NORVASC) 10 MG Tab, Take 10 mg by mouth every day., Disp: , Rfl:     atorvastatin (LIPITOR) 20 MG Tab, Take 40 mg by mouth every evening. (Patient not taking: Reported on 4/30/2023), Disp: , Rfl:     cloNIDine (CATAPRES) 0.3 MG Tab, Take 0.3 mg by mouth 2 times a day. (Patient not taking: Reported on 4/30/2023), Disp: , Rfl:     DILTIAZem CD (CARDIZEM CD) 180 MG CAPSULE SR 24 HR, Take 180 mg by mouth every day. (Patient not taking: Reported on 4/30/2023), Disp: , Rfl:     fenofibrate (TRIGLIDE) 160 MG tablet, Take 160 mg by mouth every day., Disp: , Rfl:     gabapentin (NEURONTIN) 300 MG Cap, Take 300 mg by mouth 3 times a day., Disp: , Rfl:     glyBURIDE (DIABETA) 2.5 MG Tab, Take 2.5 mg by mouth every morning with breakfast., Disp: , Rfl:     lamoTRIgine (LAMICTAL) 100 MG Tab,  Take 50 mg by mouth every day., Disp: , Rfl:     omeprazole (PRILOSEC) 20 MG delayed-release capsule, Take 40 mg by mouth every day. (Patient not taking: Reported on 4/30/2023), Disp: , Rfl:     oxyCODONE-acetaminophen (PERCOCET) 5-325 MG Tab, Take 1 Tablet by mouth every four hours as needed. (Patient not taking: Reported on 4/30/2023), Disp: , Rfl:     sertraline (ZOLOFT) 100 MG Tab, Take 200 mg by mouth every day., Disp: , Rfl:     vitamin D2, Ergocalciferol, (DRISDOL) 1.25 MG (36769 UT) Cap capsule, Take  by mouth every 7 days. (Patient not taking: Reported on 4/30/2023), Disp: , Rfl:     cyanocobalamin (VITAMIN B-12) 100 MCG Tab, Take 100 mcg by mouth every day. (Patient not taking: Reported on 4/30/2023), Disp: , Rfl:     Outpatient Medications:   (Not in a hospital admission)      Allergies:   Allergies   Allergen Reactions    Metoprolol Anaphylaxis    Cortisone      2000-10-07;hickups w/ injection       Family History:   History reviewed. No pertinent family history.    Social History:   Social History     Socioeconomic History    Marital status:      Spouse name: Not on file    Number of children: Not on file    Years of education: Not on file    Highest education level: Not on file   Occupational History    Not on file   Tobacco Use    Smoking status: Never    Smokeless tobacco: Current     Types: Chew   Vaping Use    Vaping Use: Never used   Substance and Sexual Activity    Alcohol use: Not Currently    Drug use: Never    Sexual activity: Not on file   Other Topics Concern    Not on file   Social History Narrative    Not on file     Social Determinants of Health     Financial Resource Strain: Not on file   Food Insecurity: Not on file   Transportation Needs: Not on file   Physical Activity: Not on file   Stress: Not on file   Social Connections: Not on file   Intimate Partner Violence: Not on file   Housing Stability: Not on file       Physical Exam:  AAO x 3    BP (!) 178/86   Pulse 64   Temp  "36.7 °C (98 °F) (Temporal)   Resp 20   Ht 1.676 m (5' 6\")   Wt 108 kg (237 lb 3.4 oz)   SpO2 96%  Body mass index is 38.29 kg/m².    Eye Exam:   Base Eye Exam       Visual Acuity (Snellen - Linear)         Right Left    Near sc 20/30 20/100      Correction: Glasses              Tonometry ([facility], 6:02 PM)         Right Left    Pressure 11 12              Pupils         Pupils    Right PERRL    Left PERRL              Visual Fields         Right Left     Full Full              Extraocular Movement         Right Left     0 0 0   0  0   0 0 0    -- -- --   -3  --   -- -- --                 Neuro/Psych       Oriented x3: Yes    Mood/Affect: Normal                  Slit Lamp and Fundus Exam       External Exam         Right Left    External Normal Normal              Pen Light Exam         Right Left    Lids/Lashes Normal fullness of LL    Conjunctiva/Sclera White and quiet nasal 2+ injection    Cornea Clear Clear    Anterior Chamber Formed Formed    Iris Round and reactive Round and reactive    Lens Nuclear sclerosis Nuclear sclerosis                    Imaging: CT max/face reviewed by me. \"There is a heterogeneously enhancing infiltrative extraconal masslike soft tissue abnormality in the inferomedial aspect of the left orbit. Due to the infiltrative appearance definitive measurement is limited but would be estimated at 2.9 x 1.5 x 3.5 cm. This does not appear to be originating from the medial or inferior rectus muscle. The globe is deviated laterally as is the medial rectus muscle. There is no associated bony destruction. The medial wall the orbit and lamina papyracea are intact.\"    Labs: WBC 5.3 (wnl)    Assessment/Plan: 62M with remote hx of prostate cancer s/p radical prostatectomy, here with left medial orbital mass. Ddx is broad, including but not limited to lymphoma and metastasis. I spoke with Dr. Bony Oliveira on the phone who agreed to see the patient in his office for evaluation and likely orbital biopsy as " outpatient.     Please have patient call Dr. Oliveira's office at [phone] to make appointment.     The above explained to patient who understands and agrees with the plan of care. All questions answered.     Virginia Quinones MD  Ophthalmology   [phone]

## 2023-11-28 NOTE — ED NOTES
Pt has been provided DC instructions including follow up with opthalmology and ocuplastics. He verbalized understanding. Educated to return to ED for new or worsening concerns. He is ambulatory on DC.

## 2024-01-11 ENCOUNTER — HOSPITAL ENCOUNTER (OUTPATIENT)
Dept: RADIOLOGY | Facility: MEDICAL CENTER | Age: 64
End: 2024-01-11
Attending: INTERNAL MEDICINE
Payer: COMMERCIAL

## 2024-01-11 DIAGNOSIS — Z85.46 PERSONAL HISTORY OF MALIGNANT NEOPLASM OF PROSTATE: ICD-10-CM

## 2024-01-11 DIAGNOSIS — C79.2 SECONDARY MALIGNANT NEOPLASM OF SKIN (HCC): ICD-10-CM

## 2024-01-11 PROCEDURE — A9579 GAD-BASE MR CONTRAST NOS,1ML: HCPCS | Performed by: INTERNAL MEDICINE

## 2024-01-11 PROCEDURE — 700117 HCHG RX CONTRAST REV CODE 255: Performed by: INTERNAL MEDICINE

## 2024-01-11 PROCEDURE — 70553 MRI BRAIN STEM W/O & W/DYE: CPT

## 2024-01-11 RX ADMIN — GADOTERIDOL 20 ML: 279.3 INJECTION, SOLUTION INTRAVENOUS at 11:29

## 2024-01-12 ENCOUNTER — HOSPITAL ENCOUNTER (OUTPATIENT)
Dept: RADIOLOGY | Facility: MEDICAL CENTER | Age: 64
End: 2024-01-12
Payer: COMMERCIAL

## 2024-04-09 ENCOUNTER — HOSPITAL ENCOUNTER (OUTPATIENT)
Dept: RADIOLOGY | Facility: MEDICAL CENTER | Age: 64
End: 2024-04-09
Attending: FAMILY MEDICINE
Payer: COMMERCIAL

## 2024-04-09 DIAGNOSIS — C69.62 MALIGNANT NEOPLASM OF LEFT ORBIT (HCC): ICD-10-CM

## 2024-04-09 PROCEDURE — 70553 MRI BRAIN STEM W/O & W/DYE: CPT

## 2024-04-09 PROCEDURE — A9579 GAD-BASE MR CONTRAST NOS,1ML: HCPCS | Performed by: RADIOLOGY

## 2024-04-09 PROCEDURE — 700117 HCHG RX CONTRAST REV CODE 255: Performed by: RADIOLOGY

## 2024-04-09 PROCEDURE — 70543 MRI ORBT/FAC/NCK W/O &W/DYE: CPT

## 2024-04-09 RX ADMIN — GADOTERIDOL 20 ML: 279.3 INJECTION, SOLUTION INTRAVENOUS at 08:43

## 2024-06-20 ENCOUNTER — HOSPITAL ENCOUNTER (OUTPATIENT)
Dept: RADIOLOGY | Facility: MEDICAL CENTER | Age: 64
End: 2024-06-20
Attending: RADIOLOGY
Payer: COMMERCIAL

## 2024-06-20 ENCOUNTER — HOSPITAL ENCOUNTER (OUTPATIENT)
Dept: RADIOLOGY | Facility: MEDICAL CENTER | Age: 64
End: 2024-06-10
Attending: INTERNAL MEDICINE
Payer: COMMERCIAL

## 2024-06-20 DIAGNOSIS — C69.62 MALIGNANT NEOPLASM OF LEFT ORBIT (HCC): ICD-10-CM

## 2024-06-20 DIAGNOSIS — C79.2 SECONDARY MALIGNANT NEOPLASM OF SKIN (HCC): ICD-10-CM

## 2024-06-20 DIAGNOSIS — Z85.46 PERSONAL HISTORY OF MALIGNANT NEOPLASM OF PROSTATE: ICD-10-CM

## 2024-06-20 PROCEDURE — A9579 GAD-BASE MR CONTRAST NOS,1ML: HCPCS | Performed by: INTERNAL MEDICINE

## 2024-06-20 PROCEDURE — 700117 HCHG RX CONTRAST REV CODE 255: Performed by: INTERNAL MEDICINE

## 2024-06-20 PROCEDURE — 70543 MRI ORBT/FAC/NCK W/O &W/DYE: CPT

## 2024-06-20 PROCEDURE — 70553 MRI BRAIN STEM W/O & W/DYE: CPT

## 2024-06-20 RX ADMIN — GADOTERIDOL 20 ML: 279.3 INJECTION, SOLUTION INTRAVENOUS at 10:16

## 2024-07-19 ENCOUNTER — HOSPITAL ENCOUNTER (OUTPATIENT)
Dept: RADIOLOGY | Facility: MEDICAL CENTER | Age: 64
End: 2024-07-19
Attending: INTERNAL MEDICINE
Payer: COMMERCIAL

## 2024-07-19 DIAGNOSIS — C79.2 SECONDARY MALIGNANT NEOPLASM OF SKIN (HCC): ICD-10-CM

## 2024-07-19 DIAGNOSIS — Z85.46 PERSONAL HISTORY OF MALIGNANT NEOPLASM OF PROSTATE: ICD-10-CM

## 2024-07-19 DIAGNOSIS — C69.62 MALIGNANT NEOPLASM OF LEFT ORBIT (HCC): ICD-10-CM

## 2024-07-19 PROCEDURE — A9579 GAD-BASE MR CONTRAST NOS,1ML: HCPCS | Mod: JZ

## 2024-07-19 PROCEDURE — 70543 MRI ORBT/FAC/NCK W/O &W/DYE: CPT

## 2024-07-19 PROCEDURE — 700117 HCHG RX CONTRAST REV CODE 255: Mod: JZ

## 2024-07-19 RX ADMIN — GADOTERIDOL 20 ML: 279.3 INJECTION, SOLUTION INTRAVENOUS at 11:51

## 2024-08-20 ENCOUNTER — APPOINTMENT (RX ONLY)
Dept: URBAN - METROPOLITAN AREA CLINIC 15 | Facility: CLINIC | Age: 64
Setting detail: DERMATOLOGY
End: 2024-08-20

## 2024-08-20 DIAGNOSIS — D485 NEOPLASM OF UNCERTAIN BEHAVIOR OF SKIN: ICD-10-CM

## 2024-08-20 PROBLEM — D48.5 NEOPLASM OF UNCERTAIN BEHAVIOR OF SKIN: Status: ACTIVE | Noted: 2024-08-20

## 2024-08-20 PROCEDURE — ? BIOPSY BY PUNCH METHOD

## 2024-08-20 PROCEDURE — 11104 PUNCH BX SKIN SINGLE LESION: CPT

## 2024-08-20 ASSESSMENT — LOCATION DETAILED DESCRIPTION DERM: LOCATION DETAILED: LEFT INFERIOR CENTRAL MALAR CHEEK

## 2024-08-20 ASSESSMENT — LOCATION ZONE DERM: LOCATION ZONE: FACE

## 2024-08-20 ASSESSMENT — LOCATION SIMPLE DESCRIPTION DERM: LOCATION SIMPLE: LEFT CHEEK

## 2024-08-20 NOTE — PROCEDURE: BIOPSY BY PUNCH METHOD
Detail Level: Detailed
Was A Bandage Applied: Yes
Punch Size In Mm: 4
Size Of Lesion In Cm (Optional): 4.5
X Size Of Lesion In Cm (Optional): 3.7
Depth Of Punch Biopsy: deep fat
Biopsy Type: H and E
Anesthesia Type: 1% lidocaine with epinephrine
Anesthesia Volume In Cc: 0.5
Additional Anesthesia Volume In Cc (Will Not Render If 0): 0
Hemostasis: None
Epidermal Sutures: 5-0 Fast Absorbing Gut
Wound Care: Vaseline
Dressing: Band-Aid
Patient Will Remove Sutures At Home?: No
Lab: [id number]
Lab Facility: 
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 Punch biposy performed of small nodule (favor overlying seb hyperplasia) and into deeper, firm mass. Please see oncologist’s referral note in chart for further details. Also with remote history of treated prostate cancer.
Consent: Written consent was obtained and risks were reviewed including but not limited to scarring, infection, bleeding, scabbing, incomplete removal, nerve damage and allergy to anesthesia.
Post-Care Instructions: I reviewed with the patient in detail post-care instructions. Patient is to keep the biopsy site dry overnight, and then apply bacitracin twice daily until healed. Patient may apply hydrogen peroxide soaks to remove any crusting.
Home Suture Removal Text: Patient was provided a home suture removal kit and will remove their sutures at home.  If they have any questions or difficulties they will call the office.
Notification Instructions: Patient will be notified of biopsy results. However, patient instructed to call the office if not contacted within 2 weeks.
Billing Type: Third-Party Bill
Information: Selecting Yes will display possible errors in your note based on the variables you have selected. This validation is only offered as a suggestion for you. PLEASE NOTE THAT THE VALIDATION TEXT WILL BE REMOVED WHEN YOU FINALIZE YOUR NOTE. IF YOU WANT TO FAX A PRELIMINARY NOTE YOU WILL NEED TO TOGGLE THIS TO 'NO' IF YOU DO NOT WANT IT IN YOUR FAXED NOTE.

## 2025-03-07 ENCOUNTER — HOSPITAL ENCOUNTER (OUTPATIENT)
Dept: RADIOLOGY | Facility: MEDICAL CENTER | Age: 65
End: 2025-03-07
Payer: COMMERCIAL

## 2025-03-07 DIAGNOSIS — Z51.11 ENCOUNTER FOR ANTINEOPLASTIC CHEMOTHERAPY: ICD-10-CM

## 2025-03-07 DIAGNOSIS — T45.1X5A ADVERSE EFFECT OF ANTINEOPLASTIC AND IMMUNOSUPPRESSIVE DRUGS, INITIAL ENCOUNTER: ICD-10-CM

## 2025-03-07 DIAGNOSIS — T45.1X5A AGRANULOCYTOSIS SECONDARY TO CANCER CHEMOTHERAPY (HCC): ICD-10-CM

## 2025-03-07 DIAGNOSIS — D70.1 AGRANULOCYTOSIS SECONDARY TO CANCER CHEMOTHERAPY (HCC): ICD-10-CM

## 2025-03-07 DIAGNOSIS — C06.0 MALIGNANT NEOPLASM OF CHEEK MUCOSA (HCC): ICD-10-CM

## 2025-03-07 DIAGNOSIS — C79.2 SECONDARY MALIGNANT NEOPLASM OF SKIN (HCC): ICD-10-CM

## 2025-03-07 DIAGNOSIS — C69.62 MALIGNANT NEOPLASM OF LEFT ORBIT (HCC): ICD-10-CM

## 2025-03-07 DIAGNOSIS — Z85.46 PERSONAL HISTORY OF MALIGNANT NEOPLASM OF PROSTATE: ICD-10-CM

## 2025-03-07 PROCEDURE — 700117 HCHG RX CONTRAST REV CODE 255: Mod: JZ

## 2025-03-07 PROCEDURE — A9579 GAD-BASE MR CONTRAST NOS,1ML: HCPCS | Mod: JZ

## 2025-03-07 PROCEDURE — 70543 MRI ORBT/FAC/NCK W/O &W/DYE: CPT

## 2025-03-07 RX ADMIN — GADOTERIDOL 20 ML: 279.3 INJECTION, SOLUTION INTRAVENOUS at 09:18

## 2025-03-09 ENCOUNTER — APPOINTMENT (OUTPATIENT)
Dept: URGENT CARE | Facility: CLINIC | Age: 65
End: 2025-03-09
Payer: COMMERCIAL